# Patient Record
Sex: FEMALE | Race: WHITE | NOT HISPANIC OR LATINO | Employment: OTHER | URBAN - METROPOLITAN AREA
[De-identification: names, ages, dates, MRNs, and addresses within clinical notes are randomized per-mention and may not be internally consistent; named-entity substitution may affect disease eponyms.]

---

## 2017-01-03 ENCOUNTER — HOSPITAL ENCOUNTER (OUTPATIENT)
Dept: PHYSICAL THERAPY | Facility: HOSPITAL | Age: 57
Setting detail: RECURRING SERIES
Discharge: HOME | End: 2017-01-16
Attending: NURSE PRACTITIONER | Admitting: NURSE PRACTITIONER

## 2017-01-17 ENCOUNTER — HOSPITAL ENCOUNTER (OUTPATIENT)
Dept: ORTHOPEDIC SURGERY | Facility: CLINIC | Age: 57
Discharge: HOME | End: 2017-01-17
Attending: PODIATRIST | Admitting: PODIATRIST

## 2017-01-17 ENCOUNTER — CONVERSION ENCOUNTER (OUTPATIENT)
Dept: ORTHOPEDIC SURGERY | Facility: CLINIC | Age: 57
End: 2017-01-17

## 2017-04-18 ENCOUNTER — CONVERSION ENCOUNTER (OUTPATIENT)
Dept: ORTHOPEDIC SURGERY | Facility: CLINIC | Age: 57
End: 2017-04-18

## 2017-04-28 ENCOUNTER — OFFICE (AMBULATORY)
Dept: URBAN - METROPOLITAN AREA CLINIC 64 | Facility: CLINIC | Age: 57
End: 2017-04-28
Payer: COMMERCIAL

## 2017-04-28 VITALS
HEIGHT: 69 IN | WEIGHT: 293 LBS | SYSTOLIC BLOOD PRESSURE: 120 MMHG | HEART RATE: 57 BPM | DIASTOLIC BLOOD PRESSURE: 65 MMHG

## 2017-04-28 DIAGNOSIS — Z86.010 PERSONAL HISTORY OF COLONIC POLYPS: ICD-10-CM

## 2017-04-28 DIAGNOSIS — K59.00 CONSTIPATION, UNSPECIFIED: ICD-10-CM

## 2017-04-28 PROCEDURE — 99203 OFFICE O/P NEW LOW 30 MIN: CPT | Performed by: INTERNAL MEDICINE

## 2017-04-28 RX ORDER — POLYETHYLENE GLYCOL 3350 17 G/17G
17 POWDER, FOR SOLUTION ORAL
Qty: 1 | Refills: 11 | Status: COMPLETED
Start: 2017-04-28 | End: 2017-09-27

## 2017-05-04 ENCOUNTER — CONVERSION ENCOUNTER (OUTPATIENT)
Dept: ORTHOPEDIC SURGERY | Facility: CLINIC | Age: 57
End: 2017-05-04

## 2017-06-29 ENCOUNTER — OFFICE (AMBULATORY)
Dept: URBAN - METROPOLITAN AREA CLINIC 64 | Facility: CLINIC | Age: 57
End: 2017-06-29
Payer: COMMERCIAL

## 2017-06-29 VITALS
WEIGHT: 293 LBS | HEART RATE: 62 BPM | SYSTOLIC BLOOD PRESSURE: 130 MMHG | HEIGHT: 69 IN | DIASTOLIC BLOOD PRESSURE: 70 MMHG

## 2017-06-29 DIAGNOSIS — Z86.010 PERSONAL HISTORY OF COLONIC POLYPS: ICD-10-CM

## 2017-06-29 DIAGNOSIS — K59.00 CONSTIPATION, UNSPECIFIED: ICD-10-CM

## 2017-06-29 PROCEDURE — 99214 OFFICE O/P EST MOD 30 MIN: CPT | Performed by: INTERNAL MEDICINE

## 2017-09-28 ENCOUNTER — ON CAMPUS - OUTPATIENT (AMBULATORY)
Dept: URBAN - METROPOLITAN AREA HOSPITAL 2 | Facility: HOSPITAL | Age: 57
End: 2017-09-28
Payer: COMMERCIAL

## 2017-09-28 VITALS
HEART RATE: 93 BPM | OXYGEN SATURATION: 97 % | DIASTOLIC BLOOD PRESSURE: 51 MMHG | WEIGHT: 293 LBS | DIASTOLIC BLOOD PRESSURE: 89 MMHG | TEMPERATURE: 97.9 F | DIASTOLIC BLOOD PRESSURE: 78 MMHG | SYSTOLIC BLOOD PRESSURE: 127 MMHG | HEART RATE: 68 BPM | HEART RATE: 69 BPM | TEMPERATURE: 97.9 F | DIASTOLIC BLOOD PRESSURE: 67 MMHG | HEART RATE: 91 BPM | DIASTOLIC BLOOD PRESSURE: 99 MMHG | DIASTOLIC BLOOD PRESSURE: 51 MMHG | HEART RATE: 83 BPM | WEIGHT: 293 LBS | HEART RATE: 83 BPM | HEART RATE: 91 BPM | SYSTOLIC BLOOD PRESSURE: 144 MMHG | HEART RATE: 84 BPM | HEART RATE: 91 BPM | RESPIRATION RATE: 19 BRPM | OXYGEN SATURATION: 99 % | HEART RATE: 68 BPM | RESPIRATION RATE: 16 BRPM | SYSTOLIC BLOOD PRESSURE: 144 MMHG | DIASTOLIC BLOOD PRESSURE: 99 MMHG | OXYGEN SATURATION: 99 % | RESPIRATION RATE: 17 BRPM | HEART RATE: 69 BPM | HEIGHT: 69 IN | DIASTOLIC BLOOD PRESSURE: 67 MMHG | DIASTOLIC BLOOD PRESSURE: 78 MMHG | HEIGHT: 69 IN | SYSTOLIC BLOOD PRESSURE: 169 MMHG | SYSTOLIC BLOOD PRESSURE: 128 MMHG | OXYGEN SATURATION: 97 % | SYSTOLIC BLOOD PRESSURE: 142 MMHG | TEMPERATURE: 97.9 F | DIASTOLIC BLOOD PRESSURE: 89 MMHG | DIASTOLIC BLOOD PRESSURE: 89 MMHG | SYSTOLIC BLOOD PRESSURE: 128 MMHG | OXYGEN SATURATION: 96 % | DIASTOLIC BLOOD PRESSURE: 67 MMHG | HEART RATE: 93 BPM | HEART RATE: 68 BPM | SYSTOLIC BLOOD PRESSURE: 128 MMHG | HEART RATE: 85 BPM | RESPIRATION RATE: 17 BRPM | DIASTOLIC BLOOD PRESSURE: 93 MMHG | DIASTOLIC BLOOD PRESSURE: 93 MMHG | OXYGEN SATURATION: 100 % | RESPIRATION RATE: 19 BRPM | HEART RATE: 69 BPM | DIASTOLIC BLOOD PRESSURE: 69 MMHG | OXYGEN SATURATION: 100 % | RESPIRATION RATE: 17 BRPM | DIASTOLIC BLOOD PRESSURE: 69 MMHG | HEART RATE: 85 BPM | DIASTOLIC BLOOD PRESSURE: 99 MMHG | DIASTOLIC BLOOD PRESSURE: 93 MMHG | SYSTOLIC BLOOD PRESSURE: 132 MMHG | RESPIRATION RATE: 16 BRPM | SYSTOLIC BLOOD PRESSURE: 140 MMHG | OXYGEN SATURATION: 96 % | HEART RATE: 85 BPM | DIASTOLIC BLOOD PRESSURE: 78 MMHG | HEART RATE: 93 BPM | DIASTOLIC BLOOD PRESSURE: 69 MMHG | SYSTOLIC BLOOD PRESSURE: 144 MMHG | SYSTOLIC BLOOD PRESSURE: 132 MMHG | SYSTOLIC BLOOD PRESSURE: 140 MMHG | OXYGEN SATURATION: 95 % | OXYGEN SATURATION: 97 % | HEART RATE: 83 BPM | WEIGHT: 293 LBS | OXYGEN SATURATION: 95 % | SYSTOLIC BLOOD PRESSURE: 127 MMHG | OXYGEN SATURATION: 100 % | OXYGEN SATURATION: 95 % | SYSTOLIC BLOOD PRESSURE: 142 MMHG | SYSTOLIC BLOOD PRESSURE: 142 MMHG | RESPIRATION RATE: 19 BRPM | SYSTOLIC BLOOD PRESSURE: 169 MMHG | SYSTOLIC BLOOD PRESSURE: 140 MMHG | RESPIRATION RATE: 16 BRPM | HEART RATE: 84 BPM | OXYGEN SATURATION: 96 % | SYSTOLIC BLOOD PRESSURE: 169 MMHG | SYSTOLIC BLOOD PRESSURE: 127 MMHG | DIASTOLIC BLOOD PRESSURE: 51 MMHG | OXYGEN SATURATION: 99 % | HEIGHT: 69 IN | HEART RATE: 84 BPM | SYSTOLIC BLOOD PRESSURE: 132 MMHG

## 2017-09-28 DIAGNOSIS — Z86.010 PERSONAL HISTORY OF COLONIC POLYPS: ICD-10-CM

## 2017-09-28 DIAGNOSIS — Z08 ENCOUNTER FOR FOLLOW-UP EXAMINATION AFTER COMPLETED TREATMEN: ICD-10-CM

## 2017-09-28 DIAGNOSIS — K57.30 DIVERTICULOSIS OF LARGE INTESTINE WITHOUT PERFORATION OR ABS: ICD-10-CM

## 2017-09-28 PROCEDURE — 45378 DIAGNOSTIC COLONOSCOPY: CPT | Performed by: INTERNAL MEDICINE

## 2017-09-28 PROCEDURE — G0105 COLORECTAL SCRN; HI RISK IND: HCPCS | Performed by: INTERNAL MEDICINE

## 2017-09-28 RX ADMIN — PROPOFOL: 10 INJECTION, EMULSION INTRAVENOUS at 15:39

## 2018-03-06 ENCOUNTER — CONVERSION ENCOUNTER (OUTPATIENT)
Dept: ORTHOPEDIC SURGERY | Facility: CLINIC | Age: 58
End: 2018-03-06

## 2018-04-12 ENCOUNTER — CONVERSION ENCOUNTER (OUTPATIENT)
Dept: ORTHOPEDIC SURGERY | Facility: CLINIC | Age: 58
End: 2018-04-12

## 2018-05-24 ENCOUNTER — HOSPITAL ENCOUNTER (OUTPATIENT)
Dept: ORTHOPEDIC SURGERY | Facility: CLINIC | Age: 58
Discharge: HOME | End: 2018-05-24
Attending: ORTHOPAEDIC SURGERY | Admitting: ORTHOPAEDIC SURGERY

## 2018-05-24 ENCOUNTER — CONVERSION ENCOUNTER (OUTPATIENT)
Dept: ORTHOPEDIC SURGERY | Facility: CLINIC | Age: 58
End: 2018-05-24

## 2018-06-14 ENCOUNTER — CONVERSION ENCOUNTER (OUTPATIENT)
Dept: ORTHOPEDIC SURGERY | Facility: CLINIC | Age: 58
End: 2018-06-14

## 2018-07-09 ENCOUNTER — CONVERSION ENCOUNTER (OUTPATIENT)
Dept: ORTHOPEDIC SURGERY | Facility: CLINIC | Age: 58
End: 2018-07-09

## 2018-07-17 ENCOUNTER — HOSPITAL ENCOUNTER (OUTPATIENT)
Dept: PHYSICAL THERAPY | Facility: HOSPITAL | Age: 58
Setting detail: RECURRING SERIES
Discharge: HOME | End: 2018-09-19
Attending: ORTHOPAEDIC SURGERY | Admitting: ORTHOPAEDIC SURGERY

## 2018-09-24 ENCOUNTER — CONVERSION ENCOUNTER (OUTPATIENT)
Dept: ORTHOPEDIC SURGERY | Facility: CLINIC | Age: 58
End: 2018-09-24

## 2019-03-04 ENCOUNTER — CONVERSION ENCOUNTER (OUTPATIENT)
Dept: ORTHOPEDIC SURGERY | Facility: CLINIC | Age: 59
End: 2019-03-04

## 2019-06-04 VITALS
WEIGHT: 293 LBS | HEIGHT: 69 IN | BODY MASS INDEX: 46.52 KG/M2 | HEART RATE: 69 BPM | HEART RATE: 63 BPM | SYSTOLIC BLOOD PRESSURE: 151 MMHG | DIASTOLIC BLOOD PRESSURE: 78 MMHG | BODY MASS INDEX: 43.4 KG/M2 | SYSTOLIC BLOOD PRESSURE: 145 MMHG | HEIGHT: 69 IN | DIASTOLIC BLOOD PRESSURE: 73 MMHG | SYSTOLIC BLOOD PRESSURE: 119 MMHG | WEIGHT: 293 LBS | HEART RATE: 59 BPM | SYSTOLIC BLOOD PRESSURE: 130 MMHG | WEIGHT: 293 LBS | HEIGHT: 69 IN | BODY MASS INDEX: 43.4 KG/M2 | DIASTOLIC BLOOD PRESSURE: 78 MMHG | HEIGHT: 69 IN | SYSTOLIC BLOOD PRESSURE: 181 MMHG | WEIGHT: 293 LBS | HEART RATE: 69 BPM | HEIGHT: 69 IN | WEIGHT: 293 LBS | SYSTOLIC BLOOD PRESSURE: 126 MMHG | DIASTOLIC BLOOD PRESSURE: 75 MMHG | WEIGHT: 293 LBS | HEART RATE: 71 BPM | DIASTOLIC BLOOD PRESSURE: 81 MMHG | HEIGHT: 69 IN | SYSTOLIC BLOOD PRESSURE: 111 MMHG | SYSTOLIC BLOOD PRESSURE: 124 MMHG | SYSTOLIC BLOOD PRESSURE: 161 MMHG | HEART RATE: 62 BPM | WEIGHT: 293 LBS | HEART RATE: 78 BPM | WEIGHT: 293 LBS | HEART RATE: 67 BPM | DIASTOLIC BLOOD PRESSURE: 80 MMHG | WEIGHT: 293 LBS | DIASTOLIC BLOOD PRESSURE: 82 MMHG | OXYGEN SATURATION: 96 % | BODY MASS INDEX: 43.4 KG/M2 | BODY MASS INDEX: 43.4 KG/M2 | BODY MASS INDEX: 46.59 KG/M2 | OXYGEN SATURATION: 97 % | DIASTOLIC BLOOD PRESSURE: 68 MMHG | DIASTOLIC BLOOD PRESSURE: 78 MMHG | OXYGEN SATURATION: 95 % | BODY MASS INDEX: 43.4 KG/M2

## 2019-10-19 ENCOUNTER — HOSPITAL ENCOUNTER (EMERGENCY)
Facility: HOSPITAL | Age: 59
Discharge: HOME | End: 2019-10-20
Admitting: EMERGENCY MEDICINE

## 2019-10-19 VITALS
SYSTOLIC BLOOD PRESSURE: 192 MMHG | OXYGEN SATURATION: 98 % | HEART RATE: 61 BPM | RESPIRATION RATE: 17 BRPM | DIASTOLIC BLOOD PRESSURE: 96 MMHG | HEIGHT: 69 IN | TEMPERATURE: 97.9 F | WEIGHT: 293 LBS | BODY MASS INDEX: 43.4 KG/M2

## 2019-10-19 DIAGNOSIS — H60.501 ACUTE OTITIS EXTERNA OF RIGHT EAR, UNSPECIFIED TYPE: Primary | ICD-10-CM

## 2019-10-19 PROCEDURE — 99282 EMERGENCY DEPT VISIT SF MDM: CPT

## 2019-10-19 RX ORDER — NEOMYCIN SULFATE, POLYMYXIN B SULFATE AND GRAMICIDIN 1.75; 10000; .025 MG/ML; [USP'U]/ML; MG/ML
SOLUTION/ DROPS OPHTHALMIC
COMMUNITY
End: 2021-03-17

## 2019-10-19 RX ORDER — AMOXICILLIN AND CLAVULANATE POTASSIUM 875; 125 MG/1; MG/1
1 TABLET, FILM COATED ORAL 2 TIMES DAILY
COMMUNITY
End: 2021-02-07

## 2019-10-20 RX ORDER — CIPROFLOXACIN 500 MG/1
500 TABLET, FILM COATED ORAL 2 TIMES DAILY
Qty: 14 TABLET | Refills: 0 | Status: SHIPPED | OUTPATIENT
Start: 2019-10-20 | End: 2021-02-07

## 2019-10-20 NOTE — ED PROVIDER NOTES
Subjective   History:  Patient is a 59-year-old female presents to the ER with right ear pain.  She reports that since this summer she has been treated intermittently for otitis externa.  She reports that she is been placed on Keflex twice and had eardrops prescribed to her but she reports she is not getting any better.  She now reports some pain behind her right ear muffled hearing.    Onset: Several months  Location: Right ear  Duration: Intermittent  Character: Sharp pain and pressure  Aggravating/Alleviating factors: None  Radiation none  Severity: Moderate              Review of Systems   Constitutional: Negative.    HENT: Positive for ear pain.    Respiratory: Negative.    Cardiovascular: Negative.    Gastrointestinal: Negative.    Genitourinary: Negative.    Musculoskeletal: Negative.    Skin: Negative.    Neurological: Negative.        No past medical history on file.    Allergies   Allergen Reactions   • Morphine Shortness Of Breath       No past surgical history on file.    No family history on file.    Social History     Socioeconomic History   • Marital status: Legally      Spouse name: Not on file   • Number of children: Not on file   • Years of education: Not on file   • Highest education level: Not on file           Objective   Physical Exam   Constitutional: She is oriented to person, place, and time. She appears well-developed and well-nourished.   HENT:   Head: Normocephalic and atraumatic.   Slightly edematous auditory canal.  No mastoid tenderness or bogginess.  Earwax noted in right auditory canal blocking tympanic membrane   Eyes: Pupils are equal, round, and reactive to light.   Neck: Normal range of motion.   Pulmonary/Chest: Effort normal.   Musculoskeletal: Normal range of motion.   Neurological: She is alert and oriented to person, place, and time.   Skin: Skin is warm and dry.   Psychiatric: She has a normal mood and affect. Her behavior is normal.       Procedures           ED  Course    No radiology results for the last day  Labs Reviewed - No data to display  Medications - No data to display                MDM  Number of Diagnoses or Management Options  Acute otitis externa of right ear, unspecified type:   Diagnosis management comments: DISPOSITION:   Chart Review:  Comorbidity:  has no past medical history on file.  Differentials:this list is not all inclusive and does not constitute the entirety of considered causes --> Otitis media versus otitis externa    Imaging: Was interpreted by physician and reviewed by myself:  No radiology results for the last day    Disposition/Treatment:  Patient is a 59-year-old female presents to the ER with intermittent right ear pain for the past several months she is been on Keflex twice and eardrops without relief.  She was initiated on a course of ciprofloxacin and she was told to follow-up with [facility] on Monday she was stable at time of discharge agreement with plan    Patient Progress  Patient progress: stable      Final diagnoses:   Acute otitis externa of right ear, unspecified type              Samia Allen PA-C  10/20/19 0019

## 2019-10-20 NOTE — ED NOTES
Pt reports earache intermittently for 2 months, was taking ear drops from PCP, returned to PCP and was told to continue using drops. Reports she is not here for pain medication.      Kasey Graham, PAULN  10/19/19 [id number]

## 2019-10-20 NOTE — DISCHARGE INSTRUCTIONS
Return to the ER for any worsening symptoms follow-up with [facility] on Monday for further management

## 2020-05-14 ENCOUNTER — OFFICE VISIT (OUTPATIENT)
Dept: CARDIOLOGY | Facility: CLINIC | Age: 60
End: 2020-05-14

## 2020-05-14 VITALS — BODY MASS INDEX: 48.14 KG/M2 | WEIGHT: 293 LBS

## 2020-05-14 DIAGNOSIS — I10 ESSENTIAL HYPERTENSION: ICD-10-CM

## 2020-05-14 DIAGNOSIS — R00.2 PALPITATIONS: Primary | ICD-10-CM

## 2020-05-14 DIAGNOSIS — G47.33 OBSTRUCTIVE SLEEP APNEA: ICD-10-CM

## 2020-05-14 PROCEDURE — 99213 OFFICE O/P EST LOW 20 MIN: CPT | Performed by: INTERNAL MEDICINE

## 2020-05-14 RX ORDER — LORATADINE 10 MG/1
TABLET ORAL
COMMUNITY
Start: 2020-05-11

## 2020-05-14 RX ORDER — FUROSEMIDE 20 MG/1
TABLET ORAL DAILY
COMMUNITY
Start: 2020-05-11

## 2020-05-14 RX ORDER — CYCLOBENZAPRINE HCL 10 MG
TABLET ORAL AS NEEDED
COMMUNITY
Start: 2016-05-05 | End: 2021-03-17

## 2020-05-14 RX ORDER — METOPROLOL SUCCINATE 25 MG/1
TABLET, EXTENDED RELEASE ORAL 2 TIMES WEEKLY
COMMUNITY
Start: 2020-05-11

## 2020-05-14 RX ORDER — UBIDECARENONE 75 MG
CAPSULE ORAL
COMMUNITY
Start: 2016-05-05 | End: 2021-02-07

## 2020-05-14 RX ORDER — ESOMEPRAZOLE 20 MG/1
CAPSULE, DELAYED RELEASE ORAL DAILY
COMMUNITY
Start: 2020-04-09

## 2020-05-14 RX ORDER — HYDROCODONE BITARTRATE AND ACETAMINOPHEN 10; 325 MG/1; MG/1
TABLET ORAL DAILY
COMMUNITY
Start: 2020-05-11

## 2020-05-14 RX ORDER — DICLOFENAC SODIUM 75 MG/1
TABLET, DELAYED RELEASE ORAL AS NEEDED
COMMUNITY
Start: 2020-02-06

## 2020-05-14 RX ORDER — TRAZODONE HYDROCHLORIDE 100 MG/1
200 TABLET ORAL NIGHTLY
COMMUNITY
Start: 2020-05-11

## 2020-05-14 RX ORDER — LEVOTHYROXINE SODIUM 112 UG/1
TABLET ORAL DAILY
COMMUNITY
Start: 2020-05-11

## 2020-05-14 RX ORDER — ALLOPURINOL 300 MG/1
TABLET ORAL
COMMUNITY
Start: 2020-02-06

## 2020-05-14 RX ORDER — KETOCONAZOLE 20 MG/ML
SHAMPOO TOPICAL
COMMUNITY
Start: 2020-05-11 | End: 2021-03-17

## 2020-05-14 RX ORDER — LISINOPRIL AND HYDROCHLOROTHIAZIDE 25; 20 MG/1; MG/1
TABLET ORAL DAILY
COMMUNITY
Start: 2020-05-11

## 2020-05-14 RX ORDER — FERROUS SULFATE 325(65) MG
325 TABLET ORAL
COMMUNITY
End: 2021-02-07

## 2020-05-14 RX ORDER — GABAPENTIN 600 MG/1
TABLET ORAL 4 TIMES DAILY
COMMUNITY
Start: 2020-05-11
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
## 2020-08-12 ENCOUNTER — OFFICE VISIT (OUTPATIENT)
Dept: NEUROLOGY | Facility: CLINIC | Age: 60
End: 2020-08-12

## 2020-08-12 ENCOUNTER — OFFICE (AMBULATORY)
Dept: URBAN - METROPOLITAN AREA CLINIC 64 | Facility: CLINIC | Age: 60
End: 2020-08-12
Payer: COMMERCIAL

## 2020-08-12 VITALS
HEART RATE: 72 BPM | BODY MASS INDEX: 43.4 KG/M2 | HEIGHT: 69 IN | WEIGHT: 293 LBS | SYSTOLIC BLOOD PRESSURE: 169 MMHG | TEMPERATURE: 98 F | DIASTOLIC BLOOD PRESSURE: 68 MMHG

## 2020-08-12 VITALS
WEIGHT: 293 LBS | SYSTOLIC BLOOD PRESSURE: 184 MMHG | HEIGHT: 69 IN | HEART RATE: 78 BPM | DIASTOLIC BLOOD PRESSURE: 84 MMHG

## 2020-08-12 DIAGNOSIS — M79.641 PAIN IN BOTH HANDS: ICD-10-CM

## 2020-08-12 DIAGNOSIS — M79.642 PAIN IN BOTH HANDS: ICD-10-CM

## 2020-08-12 DIAGNOSIS — K59.00 CONSTIPATION, UNSPECIFIED: ICD-10-CM

## 2020-08-12 DIAGNOSIS — K30 FUNCTIONAL DYSPEPSIA: ICD-10-CM

## 2020-08-12 DIAGNOSIS — G47.33 OBSTRUCTIVE SLEEP APNEA SYNDROME: Primary | ICD-10-CM

## 2020-08-12 DIAGNOSIS — R10.33 PERIUMBILICAL PAIN: ICD-10-CM

## 2020-08-12 PROBLEM — M75.100 NONTRAUMATIC TEAR OF ROTATOR CUFF: Status: ACTIVE | Noted: 2018-07-09

## 2020-08-12 PROBLEM — F32.A DEPRESSION: Status: ACTIVE | Noted: 2020-08-12

## 2020-08-12 PROBLEM — M19.91 PRIMARY LOCALIZED OSTEOARTHRITIS: Status: ACTIVE | Noted: 2018-09-24

## 2020-08-12 PROBLEM — I10 HYPERTENSION: Status: ACTIVE | Noted: 2020-08-12

## 2020-08-12 PROBLEM — M10.9 GOUT: Status: ACTIVE | Noted: 2020-08-12

## 2020-08-12 PROBLEM — M79.609 PAIN IN LIMB: Status: ACTIVE | Noted: 2018-03-06

## 2020-08-12 PROBLEM — M25.9 DISORDER OF ANKLE: Status: ACTIVE | Noted: 2017-01-17

## 2020-08-12 PROBLEM — Z80.3 FAMILY HISTORY OF MALIGNANT NEOPLASM OF BREAST: Status: ACTIVE | Noted: 2020-08-12

## 2020-08-12 PROBLEM — M19.079 ARTHRITIS OF FOOT: Status: ACTIVE | Noted: 2017-01-17

## 2020-08-12 PROBLEM — G47.30 SLEEP APNEA: Status: ACTIVE | Noted: 2020-08-12

## 2020-08-12 PROBLEM — F41.9 ANXIETY DISORDER: Status: ACTIVE | Noted: 2020-08-12

## 2020-08-12 PROBLEM — Z80.0 FAMILY HISTORY OF MALIGNANT NEOPLASM OF COLON: Status: ACTIVE | Noted: 2020-08-12

## 2020-08-12 PROBLEM — G56.03 CARPAL TUNNEL SYNDROME, BILATERAL UPPER LIMBS: Status: ACTIVE | Noted: 2018-04-12

## 2020-08-12 PROBLEM — M25.512 PAIN IN LEFT SHOULDER: Status: ACTIVE | Noted: 2018-04-12

## 2020-08-12 PROBLEM — Z83.3 FAMILY HISTORY OF DIABETES MELLITUS: Status: ACTIVE | Noted: 2020-08-12

## 2020-08-12 PROCEDURE — 99214 OFFICE O/P EST MOD 30 MIN: CPT | Performed by: NURSE PRACTITIONER

## 2020-08-12 PROCEDURE — 99213 OFFICE O/P EST LOW 20 MIN: CPT | Performed by: PSYCHIATRY & NEUROLOGY

## 2020-08-12 RX ORDER — ESOMEPRAZOLE MAGNESIUM 40 MG/1
40 CAPSULE, DELAYED RELEASE ORAL
Qty: 90 | Refills: 3 | Status: COMPLETED
Start: 2020-08-12 | End: 2023-07-10

## 2020-08-12 RX ORDER — FAMOTIDINE 20 MG/1
TABLET, FILM COATED ORAL
Qty: 660 | Refills: 1 | Status: ACTIVE

## 2020-08-12 RX ORDER — LINACLOTIDE 290 UG/1
290 CAPSULE, GELATIN COATED ORAL
Qty: 90 | Refills: 3 | Status: COMPLETED
Start: 2020-08-12 | End: 2023-07-10
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
## 2020-08-13 ENCOUNTER — TELEPHONE (OUTPATIENT)
Dept: NEUROLOGY | Facility: CLINIC | Age: 60
End: 2020-08-13

## 2020-08-13 DIAGNOSIS — G47.33 OBSTRUCTIVE SLEEP APNEA: Primary | ICD-10-CM

## 2020-08-13 NOTE — TELEPHONE ENCOUNTER
----- Message from Joseph F Seipel, MD sent at 8/12/2020  5:43 PM EDT -----   FFM and goes through [facility]     Pt wants to try a different mask

## 2020-08-20 ENCOUNTER — OFFICE VISIT (OUTPATIENT)
Dept: CARDIOLOGY | Facility: CLINIC | Age: 60
End: 2020-08-20

## 2020-08-20 VITALS
HEIGHT: 69 IN | SYSTOLIC BLOOD PRESSURE: 116 MMHG | HEART RATE: 77 BPM | DIASTOLIC BLOOD PRESSURE: 75 MMHG | WEIGHT: 293 LBS | OXYGEN SATURATION: 97 % | BODY MASS INDEX: 43.4 KG/M2

## 2020-08-20 DIAGNOSIS — I10 ESSENTIAL HYPERTENSION: Primary | ICD-10-CM

## 2020-08-20 DIAGNOSIS — R00.2 PALPITATIONS: ICD-10-CM

## 2020-08-20 DIAGNOSIS — G47.33 OBSTRUCTIVE SLEEP APNEA SYNDROME: ICD-10-CM

## 2020-08-20 PROCEDURE — 99213 OFFICE O/P EST LOW 20 MIN: CPT | Performed by: INTERNAL MEDICINE

## 2020-08-20 RX ORDER — BIOTIN 10000 MCG
CAPSULE ORAL
COMMUNITY
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
## 2021-02-07 ENCOUNTER — HOSPITAL ENCOUNTER (EMERGENCY)
Facility: HOSPITAL | Age: 61
Discharge: HOME | End: 2021-02-07
Attending: EMERGENCY MEDICINE | Admitting: EMERGENCY MEDICINE

## 2021-02-07 ENCOUNTER — APPOINTMENT (OUTPATIENT)
Dept: CT IMAGING | Facility: HOSPITAL | Age: 61
End: 2021-02-07

## 2021-02-07 VITALS
DIASTOLIC BLOOD PRESSURE: 65 MMHG | SYSTOLIC BLOOD PRESSURE: 159 MMHG | HEIGHT: 69 IN | OXYGEN SATURATION: 99 % | HEART RATE: 62 BPM | WEIGHT: 293 LBS | BODY MASS INDEX: 43.4 KG/M2 | RESPIRATION RATE: 18 BRPM | TEMPERATURE: 98.2 F

## 2021-02-07 DIAGNOSIS — H81.11 BENIGN PAROXYSMAL POSITIONAL VERTIGO OF RIGHT EAR: Primary | ICD-10-CM

## 2021-02-07 LAB
ALBUMIN SERPL-MCNC: 4.2 G/DL (ref 3.5–5.2)
ALBUMIN/GLOB SERPL: 1.6 G/DL
ALP SERPL-CCNC: 95 U/L (ref 39–117)
ALT SERPL W P-5'-P-CCNC: 33 U/L (ref 1–33)
ANION GAP SERPL CALCULATED.3IONS-SCNC: 11 MMOL/L (ref 5–15)
AST SERPL-CCNC: 26 U/L (ref 1–32)
BASOPHILS # BLD AUTO: 0.1 10*3/MM3 (ref 0–0.2)
BASOPHILS NFR BLD AUTO: 0.8 % (ref 0–1.5)
BILIRUB SERPL-MCNC: 0.2 MG/DL (ref 0–1.2)
BUN SERPL-MCNC: 15 MG/DL (ref 8–23)
BUN/CREAT SERPL: 16.3 (ref 7–25)
CALCIUM SPEC-SCNC: 9.1 MG/DL (ref 8.6–10.5)
CHLORIDE SERPL-SCNC: 105 MMOL/L (ref 98–107)
CO2 SERPL-SCNC: 26 MMOL/L (ref 22–29)
CREAT SERPL-MCNC: 0.92 MG/DL (ref 0.57–1)
DEPRECATED RDW RBC AUTO: 46.4 FL (ref 37–54)
EOSINOPHIL # BLD AUTO: 0.3 10*3/MM3 (ref 0–0.4)
EOSINOPHIL NFR BLD AUTO: 3.6 % (ref 0.3–6.2)
ERYTHROCYTE [DISTWIDTH] IN BLOOD BY AUTOMATED COUNT: 13.8 % (ref 12.3–15.4)
GFR SERPL CREATININE-BSD FRML MDRD: 62 ML/MIN/1.73
GLOBULIN UR ELPH-MCNC: 2.7 GM/DL
GLUCOSE SERPL-MCNC: 121 MG/DL (ref 65–99)
HCT VFR BLD AUTO: 38.7 % (ref 34–46.6)
HGB BLD-MCNC: 12.9 G/DL (ref 12–15.9)
HOLD SPECIMEN: NORMAL
LYMPHOCYTES # BLD AUTO: 3.4 10*3/MM3 (ref 0.7–3.1)
LYMPHOCYTES NFR BLD AUTO: 40.1 % (ref 19.6–45.3)
MCH RBC QN AUTO: 32.3 PG (ref 26.6–33)
MCHC RBC AUTO-ENTMCNC: 33.5 G/DL (ref 31.5–35.7)
MCV RBC AUTO: 96.5 FL (ref 79–97)
MONOCYTES # BLD AUTO: 0.6 10*3/MM3 (ref 0.1–0.9)
MONOCYTES NFR BLD AUTO: 7.1 % (ref 5–12)
NEUTROPHILS NFR BLD AUTO: 4.1 10*3/MM3 (ref 1.7–7)
NEUTROPHILS NFR BLD AUTO: 48.4 % (ref 42.7–76)
NRBC BLD AUTO-RTO: 0.1 /100 WBC (ref 0–0.2)
PLATELET # BLD AUTO: 251 10*3/MM3 (ref 140–450)
PMV BLD AUTO: 8.8 FL (ref 6–12)
POTASSIUM SERPL-SCNC: 4.4 MMOL/L (ref 3.5–5.2)
PROT SERPL-MCNC: 6.9 G/DL (ref 6–8.5)
RBC # BLD AUTO: 4.01 10*6/MM3 (ref 3.77–5.28)
SODIUM SERPL-SCNC: 142 MMOL/L (ref 136–145)
TROPONIN T SERPL-MCNC: <0.01 NG/ML (ref 0–0.03)
WBC # BLD AUTO: 8.5 10*3/MM3 (ref 3.4–10.8)

## 2021-02-07 PROCEDURE — 80053 COMPREHEN METABOLIC PANEL: CPT | Performed by: EMERGENCY MEDICINE

## 2021-02-07 PROCEDURE — 85025 COMPLETE CBC W/AUTO DIFF WBC: CPT | Performed by: EMERGENCY MEDICINE

## 2021-02-07 PROCEDURE — 84484 ASSAY OF TROPONIN QUANT: CPT | Performed by: EMERGENCY MEDICINE

## 2021-02-07 PROCEDURE — 25010000002 ONDANSETRON PER 1 MG: Performed by: EMERGENCY MEDICINE

## 2021-02-07 PROCEDURE — 70450 CT HEAD/BRAIN W/O DYE: CPT

## 2021-02-07 PROCEDURE — 96374 THER/PROPH/DIAG INJ IV PUSH: CPT

## 2021-02-07 PROCEDURE — 99284 EMERGENCY DEPT VISIT MOD MDM: CPT

## 2021-02-07 RX ORDER — MECLIZINE HYDROCHLORIDE 25 MG/1
25 TABLET ORAL ONCE
Status: COMPLETED | OUTPATIENT
Start: 2021-02-07 | End: 2021-02-07

## 2021-02-07 RX ORDER — ONDANSETRON 8 MG/1
8 TABLET, ORALLY DISINTEGRATING ORAL EVERY 8 HOURS PRN
Qty: 10 TABLET | Refills: 0 | Status: SHIPPED | OUTPATIENT
Start: 2021-02-07 | End: 2021-03-17

## 2021-02-07 RX ORDER — FLUTICASONE PROPIONATE 50 MCG
2 SPRAY, SUSPENSION (ML) NASAL DAILY
Qty: 9.9 ML | Refills: 0 | Status: SHIPPED | OUTPATIENT
Start: 2021-02-07

## 2021-02-07 RX ORDER — MECLIZINE HYDROCHLORIDE 25 MG/1
TABLET ORAL
Qty: 20 TABLET | Refills: 0 | Status: SHIPPED | OUTPATIENT
Start: 2021-02-07

## 2021-02-07 RX ORDER — ONDANSETRON 2 MG/ML
8 INJECTION INTRAMUSCULAR; INTRAVENOUS ONCE
Status: COMPLETED | OUTPATIENT
Start: 2021-02-07 | End: 2021-02-07

## 2021-02-07 RX ADMIN — ONDANSETRON 8 MG: 2 INJECTION, SOLUTION INTRAMUSCULAR; INTRAVENOUS at 02:38

## 2021-02-07 RX ADMIN — SODIUM CHLORIDE 1000 ML: 9 INJECTION, SOLUTION INTRAVENOUS at 02:31

## 2021-02-07 RX ADMIN — MECLIZINE HYDROCHLORIDE 25 MG: 25 TABLET ORAL at 02:02
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
## 2021-02-07 NOTE — DISCHARGE INSTRUCTIONS
You can try the Epley maneuver to help dizziness, see attached  Avoid rapid head movements, rest for the next 3 days  No driving for 3 days  Acacian as directed  Fluids

## 2021-02-09 ENCOUNTER — TRANSCRIBE ORDERS (OUTPATIENT)
Dept: PHYSICAL THERAPY | Facility: CLINIC | Age: 61
End: 2021-02-09

## 2021-02-09 DIAGNOSIS — M54.12 RADICULOPATHY, CERVICAL REGION: Primary | ICD-10-CM

## 2021-02-09 DIAGNOSIS — M54.13 RADICULOPATHY OF CERVICOTHORACIC REGION: ICD-10-CM

## 2021-02-09 DIAGNOSIS — M47.812 CERVICAL SPONDYLOSIS WITHOUT MYELOPATHY: ICD-10-CM

## 2021-02-23 ENCOUNTER — TREATMENT (OUTPATIENT)
Dept: PHYSICAL THERAPY | Facility: CLINIC | Age: 61
End: 2021-02-23

## 2021-02-23 DIAGNOSIS — M47.812 CERVICAL SPONDYLOSIS WITHOUT MYELOPATHY: ICD-10-CM

## 2021-02-23 DIAGNOSIS — M54.12 RADICULOPATHY, CERVICAL REGION: Primary | ICD-10-CM

## 2021-02-23 PROCEDURE — 97162 PT EVAL MOD COMPLEX 30 MIN: CPT | Performed by: PHYSICAL THERAPIST
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
## 2021-03-01 ENCOUNTER — TREATMENT (OUTPATIENT)
Dept: PHYSICAL THERAPY | Facility: CLINIC | Age: 61
End: 2021-03-01

## 2021-03-01 DIAGNOSIS — M54.12 RADICULOPATHY, CERVICAL REGION: Primary | ICD-10-CM

## 2021-03-01 DIAGNOSIS — M47.812 CERVICAL SPONDYLOSIS WITHOUT MYELOPATHY: ICD-10-CM

## 2021-03-01 PROCEDURE — 97140 MANUAL THERAPY 1/> REGIONS: CPT | Performed by: PHYSICAL THERAPIST

## 2021-03-01 PROCEDURE — 97110 THERAPEUTIC EXERCISES: CPT | Performed by: PHYSICAL THERAPIST

## 2021-03-01 PROCEDURE — 97035 APP MDLTY 1+ULTRASOUND EA 15: CPT | Performed by: PHYSICAL THERAPIST

## 2021-03-01 NOTE — PROGRESS NOTES
Physical Therapy Daily Progress Note    VISIT#: 2    Subjective   Tammie Whitehead reports: she has an appt at end of march to be assessed by neurologist for vision/feeling of passing out with movement of neck.       Objective     See Exercise, Manual, and Modality Logs for complete treatment.     Patient Education: updated HEP.    Access Code: [email or web address]   URL: [email or web address]   Date: 03/01/2021   Prepared by: Evan Sarmiento     Exercises  Seated Scapular Retraction - 10 reps - 2 sets - 1x daily - 7x weekly  Seated Shoulder Rolls - 10 reps - 2 sets - 1x daily - 7x weekly  Gentle Levator Scapulae Stretch - 3 reps - 1 sets - 20 sec hold - 1x daily - 7x weekly    Assessment/Plan - increase in muscle tone in pper and middle traps - pain decreased with manual and US.       Progress per Plan of Care            Timed:         Manual Therapy:   15      mins  06119;     Therapeutic Exercise:   15      mins  52007;     Neuromuscular Idania:        mins  66260;    Therapeutic Activity:          mins  64584;     Gait Training:           mins  82717;     Ultrasound:    10     mins  00258;    Ionto                                   mins   73390  Self Care                            mins   29229  Canalith Repos                   mins  4209  Aquatic                               mins 53698    Un-Timed:  Electrical Stimulation:         mins  78434 ( );  Dry Needling         mins self-pay  Traction          mins 92637  Low Eval          Mins  65451  Mod Eval          Mins  81926  High Eval                            Mins  18667  Re-Eval                               mins  40950    Timed Treatment:  40    mins   Total Treatment:    40    mins    Asael Sarmiento, PT

## 2021-03-04 ENCOUNTER — TREATMENT (OUTPATIENT)
Dept: PHYSICAL THERAPY | Facility: CLINIC | Age: 61
End: 2021-03-04

## 2021-03-04 DIAGNOSIS — M47.812 CERVICAL SPONDYLOSIS WITHOUT MYELOPATHY: ICD-10-CM

## 2021-03-04 DIAGNOSIS — M54.12 RADICULOPATHY, CERVICAL REGION: Primary | ICD-10-CM

## 2021-03-04 PROCEDURE — 97035 APP MDLTY 1+ULTRASOUND EA 15: CPT | Performed by: PHYSICAL THERAPIST

## 2021-03-04 PROCEDURE — 97110 THERAPEUTIC EXERCISES: CPT | Performed by: PHYSICAL THERAPIST

## 2021-03-04 PROCEDURE — 97140 MANUAL THERAPY 1/> REGIONS: CPT | Performed by: PHYSICAL THERAPIST

## 2021-03-04 NOTE — PROGRESS NOTES
Physical Therapy Daily Progress Note    VISIT#: 3    Subjective   Tammiejoselo Rivasn reports: slight decrease in muscle tension after last visit.; new pillow that seems to be helping.       Objective     See Exercise, Manual, and Modality Logs for complete treatment.     Patient Education:     Assessment/Plan -  Reviewed HEP and given new theraband for home use; less sensitivity in UT's.      Progress per Plan of Care            Timed:         Manual Therapy:   15      mins  62970;     Therapeutic Exercise:   20      mins  81361;     Neuromuscular Idania:        mins  06145;    Therapeutic Activity:          mins  15735;     Gait Training:           mins  01949;     Ultrasound:    10     mins  30960;    Ionto                                   mins   24242  Self Care                            mins   89617  Canalith Repos                   mins  4209  Aquatic                               mins 85464    Un-Timed:  Electrical Stimulation:         mins  05289 ( );  Dry Needling         mins self-pay  Traction          mins 31344  Low Eval          Mins  55781  Mod Eval          Mins  22873  High Eval                            Mins  85539  Re-Eval                               mins  58495    Timed Treatment:  45    mins   Total Treatment:    45    mins    Asael Sarmiento PT

## 2021-03-12 ENCOUNTER — TELEPHONE (OUTPATIENT)
Dept: NEUROLOGY | Facility: CLINIC | Age: 61
End: 2021-03-12

## 2021-03-12 NOTE — TELEPHONE ENCOUNTER
Has the patient been seen in the last 6 months? NO LAST APPOINTMENT WAS 8-13-20    Current issue/concern with equipment: PATIENT STATES WHILE SHE IS SLEEPING SHE STATES HER CPAP IS KICKING UP TO 15.2, SHE ALSO STATES HER NOSE PIECE KEEPS COMING OUT OF HER NOSE WHILE SLEEPING NO MATTER HOW TIGHT THE MASK IS, SHE ALSO STATES SHE IS WAKING UP WITH HEADACHES. PT WAS WONDERING IF SHE NEEDS TO BE SEEN BEFORE HER ONE YEAR CHECK UP    DME company: [employer]     What kind of mask type (full or nasal)? NASAL    Are you on a modem or chip? MODERN    BEST CALL BACK : [phone]

## 2021-03-16 RX ORDER — ALBUTEROL SULFATE 90 UG/1
AEROSOL, METERED RESPIRATORY (INHALATION)
COMMUNITY
Start: 2021-02-05

## 2021-03-16 RX ORDER — ACETAMINOPHEN 160 MG
TABLET,DISINTEGRATING ORAL
COMMUNITY
Start: 2021-03-06

## 2021-03-16 RX ORDER — FAMOTIDINE 20 MG/1
TABLET, FILM COATED ORAL
COMMUNITY
Start: 2021-02-05

## 2021-03-17 ENCOUNTER — OFFICE VISIT (OUTPATIENT)
Dept: CARDIOLOGY | Facility: CLINIC | Age: 61
End: 2021-03-17

## 2021-03-17 VITALS
OXYGEN SATURATION: 95 % | DIASTOLIC BLOOD PRESSURE: 80 MMHG | SYSTOLIC BLOOD PRESSURE: 162 MMHG | HEART RATE: 60 BPM | BODY MASS INDEX: 43.4 KG/M2 | WEIGHT: 293 LBS | TEMPERATURE: 96.8 F | HEIGHT: 69 IN

## 2021-03-17 DIAGNOSIS — I10 ESSENTIAL HYPERTENSION: Primary | ICD-10-CM

## 2021-03-17 DIAGNOSIS — G47.33 OBSTRUCTIVE SLEEP APNEA SYNDROME: ICD-10-CM

## 2021-03-17 DIAGNOSIS — R00.2 PALPITATIONS: ICD-10-CM

## 2021-03-17 PROCEDURE — 99214 OFFICE O/P EST MOD 30 MIN: CPT | Performed by: INTERNAL MEDICINE

## 2021-03-17 RX ORDER — VITAMIN B COMPLEX
CAPSULE ORAL DAILY
COMMUNITY
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
## 2021-03-18 NOTE — TELEPHONE ENCOUNTER
I reviewed the smartcard report.    The average air leak and AHI are both in the very good range.    For her comfort change the pressure setting from the current maximum pressure of 14 to a maximum pressure of 13    Continue the minimum pressure of 9

## 2021-03-25 ENCOUNTER — TELEPHONE (OUTPATIENT)
Dept: PHYSICAL THERAPY | Facility: CLINIC | Age: 61
End: 2021-03-25

## 2021-03-30 ENCOUNTER — TREATMENT (OUTPATIENT)
Dept: PHYSICAL THERAPY | Facility: CLINIC | Age: 61
End: 2021-03-30

## 2021-03-30 DIAGNOSIS — M47.812 CERVICAL SPONDYLOSIS WITHOUT MYELOPATHY: ICD-10-CM

## 2021-03-30 DIAGNOSIS — M54.12 RADICULOPATHY, CERVICAL REGION: Primary | ICD-10-CM

## 2021-03-30 PROCEDURE — 97035 APP MDLTY 1+ULTRASOUND EA 15: CPT | Performed by: PHYSICAL THERAPIST

## 2021-03-30 PROCEDURE — 97140 MANUAL THERAPY 1/> REGIONS: CPT | Performed by: PHYSICAL THERAPIST

## 2021-03-30 PROCEDURE — 97110 THERAPEUTIC EXERCISES: CPT | Performed by: PHYSICAL THERAPIST

## 2021-03-30 NOTE — PROGRESS NOTES
Physical Therapy Daily Progress Note    VISIT#: 4    Subjective   Tammie Whitehead reports: she had US of neck: waiting on results - MD to review on next visit. ER with RTB painful.    Objective     See Exercise, Manual, and Modality Logs for complete treatment.     Patient Education:     Assessment/Plan -  Reviewed HEP - discotinued ER w/ TB and advised to continue remaining exercises; trigger points with STM.    Progress per Plan of Care            Timed:         Manual Therapy:   15      mins  19121;     Therapeutic Exercise:   20      mins  07594;     Neuromuscular Idania:        mins  17350;    Therapeutic Activity:          mins  36443;     Gait Training:           mins  31346;     Ultrasound:    10     mins  89140;    Ionto                                   mins   65611  Self Care                            mins   65770  Canalith Repos                   mins  4209  Aquatic                               mins 77500    Un-Timed:  Electrical Stimulation:         mins  48301 ( );  Dry Needling         mins self-pay  Traction          mins 85208  Low Eval          Mins  20910  Mod Eval          Mins  83142  High Eval                            Mins  14171  Re-Eval                               mins  88861    Timed Treatment:  45    mins   Total Treatment:    45    mins    Asael Sarmiento PT

## 2021-04-01 ENCOUNTER — TREATMENT (OUTPATIENT)
Dept: PHYSICAL THERAPY | Facility: CLINIC | Age: 61
End: 2021-04-01

## 2021-04-01 DIAGNOSIS — M54.12 RADICULOPATHY, CERVICAL REGION: Primary | ICD-10-CM

## 2021-04-01 DIAGNOSIS — M47.812 CERVICAL SPONDYLOSIS WITHOUT MYELOPATHY: ICD-10-CM

## 2021-04-01 PROCEDURE — 97110 THERAPEUTIC EXERCISES: CPT | Performed by: PHYSICAL THERAPIST

## 2021-04-01 PROCEDURE — 97035 APP MDLTY 1+ULTRASOUND EA 15: CPT | Performed by: PHYSICAL THERAPIST

## 2021-04-01 PROCEDURE — 97140 MANUAL THERAPY 1/> REGIONS: CPT | Performed by: PHYSICAL THERAPIST

## 2021-04-01 NOTE — PROGRESS NOTES
Physical Therapy Daily Progress Note    VISIT#: 5    Subjective   Tammie Whitehead reports: reports sleeping better after last visit: less pain and tension in neck.  Objective     See Exercise, Manual, and Modality Logs for complete treatment.     Patient Education: discussed instability in cervical spine as elucidated by prior MD (Dr. Rea) and need to be careful with activities.     Assessment/Plan -  Continued current course of therapy; trigger points B middle and upper traps.    Progress per Plan of Care            Timed:         Manual Therapy:   15      mins  93812;     Therapeutic Exercise:   20      mins  06051;     Neuromuscular Idania:        mins  65119;    Therapeutic Activity:          mins  61569;     Gait Training:           mins  39131;     Ultrasound:    10     mins  22435;    Ionto                                   mins   23864  Self Care                            mins   03437  Canalith Repos                   mins  4209  Aquatic                               mins 49929    Un-Timed:  Electrical Stimulation:         mins  72929 ( );  Dry Needling         mins self-pay  Traction          mins 54896  Low Eval          Mins  45616  Mod Eval          Mins  91291  High Eval                            Mins  03017  Re-Eval                               mins  76958    Timed Treatment:  45    mins   Total Treatment:    45    mins    Asael Sarmiento PT

## 2021-04-05 ENCOUNTER — TREATMENT (OUTPATIENT)
Dept: PHYSICAL THERAPY | Facility: CLINIC | Age: 61
End: 2021-04-05

## 2021-04-05 DIAGNOSIS — M47.812 CERVICAL SPONDYLOSIS WITHOUT MYELOPATHY: ICD-10-CM

## 2021-04-05 DIAGNOSIS — M54.12 RADICULOPATHY, CERVICAL REGION: Primary | ICD-10-CM

## 2021-04-05 PROCEDURE — 97035 APP MDLTY 1+ULTRASOUND EA 15: CPT | Performed by: PHYSICAL THERAPIST

## 2021-04-05 PROCEDURE — 97140 MANUAL THERAPY 1/> REGIONS: CPT | Performed by: PHYSICAL THERAPIST

## 2021-04-05 PROCEDURE — 97110 THERAPEUTIC EXERCISES: CPT | Performed by: PHYSICAL THERAPIST

## 2021-04-05 NOTE — PROGRESS NOTES
Physical Therapy Daily Progress Note    VISIT#: 6    Subjective   Tammie Whitehead reports: reports sleeping better after last visit: less pain and tension in neck. Otherwise, same.   Objective     See Exercise, Manual, and Modality Logs for complete treatment.     Patient Education: updated HEP:    Access Code: [email or web address]  URL: [email or web address]  Date: 04/05/2021  Prepared by: Evan Sarmiento    Exercises  Seated Deep Neck Flexor Nods - 1 x daily - 7 x weekly - 2 sets - 10 reps - 5 sec hold    Assessment/Plan -  Good understanding of HEP; no increase in pain with interventions.     Progress per Plan of Care            Timed:         Manual Therapy:   15      mins  81666;     Therapeutic Exercise:   20      mins  73807;     Neuromuscular Idania:        mins  50764;    Therapeutic Activity:          mins  89451;     Gait Training:           mins  39574;     Ultrasound:    10     mins  27139;    Ionto                                   mins   72979  Self Care                            mins   48562  Canalith Repos                   mins  4209  Aquatic                               mins 29401    Un-Timed:  Electrical Stimulation:         mins  55659 ( );  Dry Needling         mins self-pay  Traction          mins 33725  Low Eval          Mins  12427  Mod Eval          Mins  41357  High Eval                            Mins  28571  Re-Eval                               mins  37449    Timed Treatment:  45    mins   Total Treatment:    45    mins    Asael Sarmiento PT

## 2021-04-07 ENCOUNTER — APPOINTMENT (OUTPATIENT)
Dept: CARDIOLOGY | Facility: HOSPITAL | Age: 61
End: 2021-04-07

## 2021-04-07 ENCOUNTER — HOSPITAL ENCOUNTER (EMERGENCY)
Facility: HOSPITAL | Age: 61
Discharge: HOME | End: 2021-04-07
Admitting: EMERGENCY MEDICINE

## 2021-04-07 VITALS
HEIGHT: 69 IN | DIASTOLIC BLOOD PRESSURE: 72 MMHG | BODY MASS INDEX: 43.4 KG/M2 | OXYGEN SATURATION: 99 % | TEMPERATURE: 98.4 F | RESPIRATION RATE: 14 BRPM | SYSTOLIC BLOOD PRESSURE: 121 MMHG | WEIGHT: 293 LBS | HEART RATE: 87 BPM

## 2021-04-07 DIAGNOSIS — M79.605 LEFT LEG PAIN: Primary | ICD-10-CM

## 2021-04-07 LAB
BH CV LOWER VASCULAR LEFT COMMON FEMORAL AUGMENT: NORMAL
BH CV LOWER VASCULAR LEFT COMMON FEMORAL COMPETENT: NORMAL
BH CV LOWER VASCULAR LEFT COMMON FEMORAL COMPRESS: NORMAL
BH CV LOWER VASCULAR LEFT COMMON FEMORAL PHASIC: NORMAL
BH CV LOWER VASCULAR LEFT COMMON FEMORAL SPONT: NORMAL
BH CV LOWER VASCULAR LEFT DISTAL FEMORAL COMPRESS: NORMAL
BH CV LOWER VASCULAR LEFT GASTRONEMIUS COMPRESS: NORMAL
BH CV LOWER VASCULAR LEFT GREATER SAPH AK COMPRESS: NORMAL
BH CV LOWER VASCULAR LEFT GREATER SAPH BK COMPRESS: NORMAL
BH CV LOWER VASCULAR LEFT LESSER SAPH COMPRESS: NORMAL
BH CV LOWER VASCULAR LEFT MID FEMORAL AUGMENT: NORMAL
BH CV LOWER VASCULAR LEFT MID FEMORAL COMPETENT: NORMAL
BH CV LOWER VASCULAR LEFT MID FEMORAL COMPRESS: NORMAL
BH CV LOWER VASCULAR LEFT MID FEMORAL PHASIC: NORMAL
BH CV LOWER VASCULAR LEFT MID FEMORAL SPONT: NORMAL
BH CV LOWER VASCULAR LEFT PERONEAL COMPRESS: NORMAL
BH CV LOWER VASCULAR LEFT POPLITEAL AUGMENT: NORMAL
BH CV LOWER VASCULAR LEFT POPLITEAL COMPETENT: NORMAL
BH CV LOWER VASCULAR LEFT POPLITEAL COMPRESS: NORMAL
BH CV LOWER VASCULAR LEFT POPLITEAL PHASIC: NORMAL
BH CV LOWER VASCULAR LEFT POPLITEAL SPONT: NORMAL
BH CV LOWER VASCULAR LEFT POSTERIOR TIBIAL COMPRESS: NORMAL
BH CV LOWER VASCULAR LEFT PROXIMAL FEMORAL COMPRESS: NORMAL
BH CV LOWER VASCULAR LEFT SAPHENOFEMORAL JUNCTION COMPRESS: NORMAL
BH CV LOWER VASCULAR LEFT VARICOSITY AK COMPRESS: NORMAL
BH CV LOWER VASCULAR LEFT VARICOSITY BK COMPRESS: NORMAL
BH CV LOWER VASCULAR RIGHT COMMON FEMORAL AUGMENT: NORMAL
BH CV LOWER VASCULAR RIGHT COMMON FEMORAL COMPETENT: NORMAL
BH CV LOWER VASCULAR RIGHT COMMON FEMORAL COMPRESS: NORMAL
BH CV LOWER VASCULAR RIGHT COMMON FEMORAL PHASIC: NORMAL
BH CV LOWER VASCULAR RIGHT COMMON FEMORAL SPONT: NORMAL

## 2021-04-07 PROCEDURE — 93971 EXTREMITY STUDY: CPT

## 2021-04-07 PROCEDURE — 99282 EMERGENCY DEPT VISIT SF MDM: CPT

## 2021-04-07 NOTE — DISCHARGE INSTRUCTIONS
Return for evaluation to the ED if worsening swelling or pain occurs either lower extremity. Patient okay to use home medication for pain control as prescribed.
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
## 2021-04-08 ENCOUNTER — TREATMENT (OUTPATIENT)
Dept: PHYSICAL THERAPY | Facility: CLINIC | Age: 61
End: 2021-04-08

## 2021-04-08 DIAGNOSIS — M47.812 CERVICAL SPONDYLOSIS WITHOUT MYELOPATHY: ICD-10-CM

## 2021-04-08 DIAGNOSIS — M54.12 RADICULOPATHY, CERVICAL REGION: Primary | ICD-10-CM

## 2021-04-08 PROCEDURE — 97035 APP MDLTY 1+ULTRASOUND EA 15: CPT | Performed by: PHYSICAL THERAPIST

## 2021-04-08 PROCEDURE — 97110 THERAPEUTIC EXERCISES: CPT | Performed by: PHYSICAL THERAPIST

## 2021-04-08 PROCEDURE — 97140 MANUAL THERAPY 1/> REGIONS: CPT | Performed by: PHYSICAL THERAPIST
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
## 2021-04-09 NOTE — PROGRESS NOTES
Discharge Summary  Discharge Summary from Physical Therapy Report    Patient: Tammie Whitehead   : 1960  Diagnosis/ICD-10 Code:  Radiculopathy, cervical region [M54.12]  Referring practitioner: Endy Pulido MD  Date of Initial Visit: 2021      Dates  PT visit: 21 to 21  Number of Visits: 7     Discharge Status of Patient: See above note for details of discharge.    Goals: Partially Met    Discharge Plan: Future need for rehabilitation activities  Patient to return to referring/providing physician  Refer to other services (specify):consider consult with spine specialist.      Date of Discharge 21        Asael Sarmiento PT  Physical Therapist

## 2021-09-13 ENCOUNTER — TELEPHONE (OUTPATIENT)
Dept: NEUROLOGY | Facility: CLINIC | Age: 61
End: 2021-09-13

## 2021-09-13 NOTE — TELEPHONE ENCOUNTER
Caller: Tammie Whitehead     Relationship: SELF    Best call back number: [phone]  Have you registered the machine with [employer]: [x]Yes []No    If NO, you will need to register the machine first, once you have it registered you can give us a call back.      [facility] phone number: [phone]   [employer] website: [email or web address]    If yes, continue with questions:    How old is the current machine: UNKNOWN    Who is the DME: [facility]    PT IS CALLING TO S/W PROVIDER RE: NEXT STEPS RE: CPAP.    PLEASE CALL & ADVISE.    THANK YOU.

## 2021-09-30 ENCOUNTER — TELEPHONE (OUTPATIENT)
Dept: CARDIOLOGY | Facility: CLINIC | Age: 61
End: 2021-09-30

## 2021-09-30 NOTE — TELEPHONE ENCOUNTER
Called and spoke to patient to see where her echo was done she said it was done and [facility]. Called [facility] to get echo faxed to us.

## 2021-09-30 NOTE — TELEPHONE ENCOUNTER
Wants apt asap with Dr. Whitfield. Issues with heart. BP staying high, 150-170/ 71-85. Heart rate 55-59. She needs to be on proper medication before something happens. Will not go to ER, scared of getting covid.

## 2021-09-30 NOTE — TELEPHONE ENCOUNTER
Patient left a voicemail stating it is not just blood pressure issues, states this has been going on for awhile, and ' if dr. cleary does not want to address it she will find another cardiologist' states she had abn echo, do not see echo report anywhere in patient's chart regarding this. Called her back and left a voicemail for her to call me back so I could get this information.

## 2021-09-30 NOTE — TELEPHONE ENCOUNTER
Called patient, asked patient if she was having any cardiac symptoms, patient said no, advised Dr. Whitfield said to follow up with PCP if only blood pressure issue, patient hung up on me after I told her.

## 2021-10-08 NOTE — PROGRESS NOTES
"Chief Complaint  Sleep Apnea    Subjective          Tammie Whitehead presents to [facility] NEUROLOGY  History of Present Illness  RAFAEL, patient f/u with CPAP compliance uses a nasal pillows and goes through [employer] for supplies.Having issues with mask.    Sleep testing history:    On NPSG at [facility] , 04/18/2017 patient had Severe obstructive sleep apnea syndrome with apnea-hypopnea index of 47 per sleep hour,     PAP download:  The patient is on CPAP therapy at 9-14 cm/H2O.   Data indicates Excellent compliance. With 100% usage for more than 4 hours with an average usage of 10 hours 27 minutes. AHI down to 1.9 .  Average pressures 11.7.  Average large leak 12min.   Pt co pressure in chest if pressure stays up at 14  The patient's hypersomnia has resolved       [place] Sleepiness Scale:  Sitting and reading 0 WatchingTV 0  Sitting, inactive, in a public place 0  As a passenger in a car for 1 hour w/o a break  0  Lying down to rest in the afternoon  3  Sitting and talking to someone  0  Sitting quietly after a lunch  0  In a car, while stopped for traffic or a light  0  Total 3    Review of Systems   Constitutional: Negative for fatigue and fever.   HENT: Positive for ear pain. Negative for ear discharge.    Eyes: Negative for pain and itching.   Respiratory: Negative for cough and shortness of breath.    Cardiovascular: Negative for chest pain.   Gastrointestinal: Negative for abdominal pain and nausea.   Musculoskeletal: Positive for neck pain. Negative for back pain.   Neurological: Negative for dizziness and light-headedness.   Psychiatric/Behavioral: Negative for agitation and confusion.         Objective   Vital Signs:   BP (!) 196/71 (BP Location: Right arm, Patient Position: Sitting, Cuff Size: Adult)   Pulse 67   Temp 96.8 °F (36 °C)   Ht 175.3 cm (69\")   Wt (!) 150 kg (330 lb)   BMI 48.73 kg/m²     Physical Exam  Vitals reviewed.   Constitutional:       Appearance: Normal appearance. "   Neurological:      General: No focal deficit present.      Mental Status: She is alert and oriented to person, place, and time.   Psychiatric:         Mood and Affect: Mood normal.         Behavior: Behavior normal.        Result Review :                 Assessment and Plan    Diagnoses and all orders for this visit:    1. Obstructive sleep apnea syndrome (Primary)    will decrease max pressure from 14 to 13  Pt aware of cpap recall   The patient is compliant with and benefiting from PAP therapy.      Follow Up   Return in about 1 year (around 10/18/2022).    Patient was given instructions and counseling regarding her condition or for health maintenance advice. Please see specific information pulled into the AVS if appropriate.       This document has been electronically signed by Joseph Seipel, MD on October 18, 2021 16:16 EDT

## 2021-10-18 ENCOUNTER — OFFICE VISIT (OUTPATIENT)
Dept: NEUROLOGY | Facility: CLINIC | Age: 61
End: 2021-10-18

## 2021-10-18 VITALS
HEART RATE: 67 BPM | BODY MASS INDEX: 43.4 KG/M2 | HEIGHT: 69 IN | DIASTOLIC BLOOD PRESSURE: 71 MMHG | TEMPERATURE: 96.8 F | SYSTOLIC BLOOD PRESSURE: 196 MMHG | WEIGHT: 293 LBS

## 2021-10-18 DIAGNOSIS — G47.33 OBSTRUCTIVE SLEEP APNEA SYNDROME: Primary | ICD-10-CM

## 2021-10-18 PROCEDURE — 99213 OFFICE O/P EST LOW 20 MIN: CPT | Performed by: PSYCHIATRY & NEUROLOGY

## 2021-10-18 RX ORDER — LISINOPRIL 20 MG/1
TABLET ORAL
COMMUNITY
Start: 2021-10-05

## 2021-10-18 RX ORDER — HYDROXYZINE PAMOATE 50 MG/1
CAPSULE ORAL
COMMUNITY
Start: 2021-09-28

## 2021-10-18 RX ORDER — ONDANSETRON 8 MG/1
TABLET, ORALLY DISINTEGRATING ORAL
COMMUNITY
Start: 2021-10-05

## 2021-10-19 ENCOUNTER — TELEPHONE (OUTPATIENT)
Dept: NEUROLOGY | Facility: CLINIC | Age: 61
End: 2021-10-19

## 2021-10-19 DIAGNOSIS — G47.33 OBSTRUCTIVE SLEEP APNEA SYNDROME: Primary | ICD-10-CM

## 2021-10-19 NOTE — TELEPHONE ENCOUNTER
----- Message from Joseph F Seipel, MD sent at 10/18/2021  4:17 PM EDT -----  Nasal pillows  goulds

## 2022-07-20 ENCOUNTER — TELEPHONE (OUTPATIENT)
Dept: NEUROLOGY | Facility: CLINIC | Age: 62
End: 2022-07-20

## 2022-07-20 NOTE — TELEPHONE ENCOUNTER
Provider: SEIPEL  Caller: PATIENT  Relationship to Patient: SELF  Pharmacy: N/A  Phone Number: [phone]  Reason for Call: PATIENT TELEPHONED TO ADVISE THAT SHE THINKS SHE FOUND THE PROBLEM; IT LOOKS LIKE THERE IS A MISSING PIECE IN HER REPLACEMENT UNIT.    ALSO, PATIENT SAYS THIS MACHINE IS NOT NEW; IT IS A REFURBISH AS ON THE BOTTOM OF THE MACHINE STATES 'RE-CERTIFIED DEVICE'. PATIENT PUT ON THE MASK & SMELLED CIGARETTE SMOKE. PATIENT DOES NOT SMOKE.    PATIENT IS WANTING A NEW MACHINE WITH ALL THE PARTS.    MACHINE CAME FROM [facility]RU [facility] IN [place]    NOTE: PATIENT STILL HAS OLD MACHINE AS IT WAS IN THE PROCESS OF BEING SENT BACK, BUT WAS ABLE TO BE RETRIEVED.    PLEASE CALL & ADVISE.    THANK YOU.

## 2022-07-20 NOTE — TELEPHONE ENCOUNTER
Provider: DR. SEIPEL  Caller: KAREN  Relationship to Patient: SELF  Pharmacy: N/A  Phone Number: [phone]  Reason for Call: PATIENT JUST RECEIVED HER REPLACEMENT CPAP, STATES SHE PUT THE SIM CARD IN THE MACHINE AND SHE CONNECTED THE MODEM, THE PRESSURE WILL NOT GO ABOVE 4.0, STATES HER MINIMUM PRESSURE IS 9.0    PATIENT STATES THAT THE OLD MACHINE HAS BEEN SENT BACK        PLEASE ADVISE     THANK YOU  When was the patient last seen: 10/18/2021

## 2022-10-12 NOTE — PROGRESS NOTES
"Chief Complaint  Sleep Apnea    Subjective          Tammie hWitehead presents to [facility] NEUROLOGY  History of Present Illness  RAFAEL  f/u for CPAP compliance, patient states she is benefiting from pap therapy,she uses nasal pillows and goes through [employer] for supplies.     Sleep testing history:    On NPSG at [facility] , 04/18/2017 patient had Severe obstructive sleep apnea syndrome with apnea-hypopnea index of 47 per sleep hour,    PAP download:  The patient is on CPAP therapy at 9-13 cm/H2O.   Data indicates Excellent compliance. With 96% usage for more than 4 hours with an average usage of 10 hours 6 minutes. AHI down to 1.7 .  Average pressures 11.  Average large leak 29min.     The patient's hypersomnia has resolved         Review of Systems   Constitutional: Negative for fatigue.   Respiratory: Negative for apnea.    Cardiovascular: Negative for chest pain.   Neurological: Negative for headaches.   All other systems reviewed and are negative.        Objective   Vital Signs:   /79 (BP Location: Left arm, Patient Position: Sitting, Cuff Size: Large Adult)   Pulse 64   Temp 97.3 °F (36.3 °C) (Infrared)   Ht 175.3 cm (69\")   Wt (!) 143 kg (316 lb)   BMI 46.67 kg/m²     Physical Exam  Vitals reviewed.   Cardiovascular:      Pulses: Normal pulses.   Pulmonary:      Effort: Pulmonary effort is normal. No respiratory distress.   Neurological:      General: No focal deficit present.      Mental Status: She is alert and oriented to person, place, and time.   Psychiatric:         Mood and Affect: Mood normal.        Result Review :                 Assessment and Plan    Diagnoses and all orders for this visit:    1. Obstructive sleep apnea syndrome (Primary)      Will decrease the pressure to 8-12 as pt has lost some weight  Adjust 20 min ramp continue start pressure 4  The patient is compliant with and benefiting from PAP therapy.      Follow Up   Return in about 1 year (around " 10/18/2023).    Patient was given instructions and counseling regarding her condition or for health maintenance advice. Please see specific information pulled into the AVS if appropriate.       This document has been electronically signed by Joseph Seipel, MD on October 18, 2022 16:15 EDT

## 2022-10-18 ENCOUNTER — OFFICE VISIT (OUTPATIENT)
Dept: NEUROLOGY | Facility: CLINIC | Age: 62
End: 2022-10-18

## 2022-10-18 VITALS
HEIGHT: 69 IN | BODY MASS INDEX: 43.4 KG/M2 | SYSTOLIC BLOOD PRESSURE: 166 MMHG | DIASTOLIC BLOOD PRESSURE: 79 MMHG | TEMPERATURE: 97.3 F | HEART RATE: 64 BPM | WEIGHT: 293 LBS

## 2022-10-18 DIAGNOSIS — G47.33 OBSTRUCTIVE SLEEP APNEA SYNDROME: Primary | ICD-10-CM

## 2022-10-18 PROCEDURE — 99213 OFFICE O/P EST LOW 20 MIN: CPT | Performed by: PSYCHIATRY & NEUROLOGY

## 2022-10-18 RX ORDER — FEXOFENADINE HCL 180 MG/1
TABLET ORAL
COMMUNITY
Start: 2022-09-07

## 2022-10-18 RX ORDER — ROSUVASTATIN CALCIUM 10 MG/1
TABLET, COATED ORAL
COMMUNITY
Start: 2022-10-17

## 2023-10-17 NOTE — PROGRESS NOTES
"Chief Complaint  Sleep Apnea    Subjective          Tammie Whitehead presents to [facility] NEUROLOGY  History of Present Illness  RAFAEL  f/u for CPAP compliance, patient states she is benefiting from pap therapy,she uses nasal pillows and goes through [employer] for supplies.     Sleep testing history:    On NPSG at [facility] , 04/18/2017 patient had Severe obstructive sleep apnea syndrome with apnea-hypopnea index of 47 per sleep hour,    PAP download:  The patient is on CPAP therapy at 8-12 cm/H2O.   Data indicates Excellent compliance. With 100% usage for more than 4 hours with an average usage of 10 hours 22 minutes. AHI down to 1.8 .  Average pressures 10.1.  Average large leak 29min.     The patient's hypersomnia has resolved       [place] Sleepiness Scale:  Sitting and reading 0 WatchingTV 0  Sitting, inactive, in a public place 0  As a passenger in a car for 1 hour w/o a break  1  Lying down to rest in the afternoon  1  Sitting and talking to someone  0  Sitting quietly after a lunch  0  In a car, while stopped for traffic or a light  0  Total 2    Review of Systems   Constitutional:  Negative for fatigue.   Respiratory:  Negative for apnea and shortness of breath.    Psychiatric/Behavioral:  Negative for sleep disturbance.    All other systems reviewed and are negative.        Objective   Vital Signs:   /79   Pulse 56   Ht 175.3 cm (69\")   Wt (!) 145 kg (320 lb)   BMI 47.26 kg/m²     Physical Exam  Vitals reviewed.   HENT:      Head: Normocephalic.      Nose: Nose normal.      Mouth/Throat:      Mouth: Mucous membranes are moist.   Cardiovascular:      Rate and Rhythm: Normal rate.   Pulmonary:      Effort: Pulmonary effort is normal. No respiratory distress.   Neurological:      General: No focal deficit present.      Mental Status: She is alert and oriented to person, place, and time.   Psychiatric:         Mood and Affect: Mood normal.      Result Review :                 Assessment " and Plan    Diagnoses and all orders for this visit:    1. Obstructive sleep apnea syndrome (Primary)      Continue CPAP 8-12  The patient is compliant with and benefiting from PAP therapy.      Follow Up   Return in about 1 year (around 10/19/2024).    Patient was given instructions and counseling regarding her condition or for health maintenance advice. Please see specific information pulled into the AVS if appropriate.       This document has been electronically signed by Joseph Seipel, MD on October 19, 2023 16:34 EDT

## 2023-10-19 ENCOUNTER — OFFICE VISIT (OUTPATIENT)
Dept: NEUROLOGY | Facility: CLINIC | Age: 63
End: 2023-10-19
Payer: MEDICAID

## 2023-10-19 VITALS
BODY MASS INDEX: 43.4 KG/M2 | HEIGHT: 69 IN | WEIGHT: 293 LBS | SYSTOLIC BLOOD PRESSURE: 151 MMHG | DIASTOLIC BLOOD PRESSURE: 79 MMHG | HEART RATE: 56 BPM

## 2023-10-19 DIAGNOSIS — G47.33 OBSTRUCTIVE SLEEP APNEA SYNDROME: Primary | ICD-10-CM

## 2023-10-19 PROBLEM — Z86.010 HISTORY OF COLONIC POLYPS: Status: ACTIVE | Noted: 2023-10-19

## 2023-10-19 PROBLEM — Z86.0100 HISTORY OF COLONIC POLYPS: Status: ACTIVE | Noted: 2023-10-19

## 2023-10-19 PROCEDURE — 1159F MED LIST DOCD IN RCRD: CPT | Performed by: PSYCHIATRY & NEUROLOGY

## 2023-10-19 PROCEDURE — 99213 OFFICE O/P EST LOW 20 MIN: CPT | Performed by: PSYCHIATRY & NEUROLOGY

## 2023-10-19 PROCEDURE — 1160F RVW MEDS BY RX/DR IN RCRD: CPT | Performed by: PSYCHIATRY & NEUROLOGY

## 2023-10-19 PROCEDURE — 3077F SYST BP >= 140 MM HG: CPT | Performed by: PSYCHIATRY & NEUROLOGY

## 2023-10-19 PROCEDURE — 3078F DIAST BP <80 MM HG: CPT | Performed by: PSYCHIATRY & NEUROLOGY

## 2024-09-11 ENCOUNTER — OFFICE (AMBULATORY)
Dept: URBAN - METROPOLITAN AREA CLINIC 64 | Facility: CLINIC | Age: 64
End: 2024-09-11
Payer: MEDICAID

## 2024-09-11 ENCOUNTER — OFFICE (AMBULATORY)
Age: 64
End: 2024-09-11
Payer: MEDICAID

## 2024-09-11 VITALS
DIASTOLIC BLOOD PRESSURE: 81 MMHG | WEIGHT: 293 LBS | WEIGHT: 293 LBS | WEIGHT: 293 LBS | HEIGHT: 69 IN | DIASTOLIC BLOOD PRESSURE: 81 MMHG | HEIGHT: 69 IN | HEART RATE: 57 BPM | SYSTOLIC BLOOD PRESSURE: 144 MMHG | HEIGHT: 69 IN | SYSTOLIC BLOOD PRESSURE: 144 MMHG | HEIGHT: 69 IN | WEIGHT: 293 LBS | HEART RATE: 57 BPM | WEIGHT: 293 LBS | HEART RATE: 57 BPM | WEIGHT: 293 LBS | WEIGHT: 293 LBS | HEIGHT: 69 IN | DIASTOLIC BLOOD PRESSURE: 81 MMHG | DIASTOLIC BLOOD PRESSURE: 81 MMHG | DIASTOLIC BLOOD PRESSURE: 81 MMHG | DIASTOLIC BLOOD PRESSURE: 81 MMHG | HEIGHT: 69 IN | SYSTOLIC BLOOD PRESSURE: 144 MMHG | HEIGHT: 69 IN | DIASTOLIC BLOOD PRESSURE: 81 MMHG | HEART RATE: 57 BPM | HEART RATE: 57 BPM | HEART RATE: 57 BPM | SYSTOLIC BLOOD PRESSURE: 144 MMHG | SYSTOLIC BLOOD PRESSURE: 144 MMHG | SYSTOLIC BLOOD PRESSURE: 144 MMHG | HEART RATE: 57 BPM | SYSTOLIC BLOOD PRESSURE: 144 MMHG

## 2024-09-11 DIAGNOSIS — R11.2 NAUSEA WITH VOMITING, UNSPECIFIED: ICD-10-CM

## 2024-09-11 DIAGNOSIS — K21.9 GASTRO-ESOPHAGEAL REFLUX DISEASE WITHOUT ESOPHAGITIS: ICD-10-CM

## 2024-09-11 DIAGNOSIS — Z86.010 PERSONAL HISTORY OF COLON POLYPS: ICD-10-CM

## 2024-09-11 PROCEDURE — 99204 OFFICE O/P NEW MOD 45 MIN: CPT | Performed by: INTERNAL MEDICINE

## 2024-09-11 RX ORDER — METOCLOPRAMIDE 5 MG/1
10 TABLET ORAL
Qty: 60 | Refills: 11 | Status: ACTIVE
Start: 2024-09-11

## 2024-09-11 RX ORDER — ONDANSETRON 4 MG/1
12 TABLET, ORALLY DISINTEGRATING ORAL
Qty: 12 | Refills: 3 | Status: ACTIVE
Start: 2024-09-11

## 2024-10-22 NOTE — PROGRESS NOTES
"Chief Complaint  Sleep Apnea    Subjective          Tammie Whitehead presents to [facility] NEUROLOGY  History of Present Illness  RAFAEL  f/u for CPAP compliance, patient states she is benefiting from pap therapy,she uses nasal pillows and goes through [employer] for supplies.     Sleep testing history:    On NPSG at [facility] , 04/18/2017 patient had Severe obstructive sleep apnea syndrome with apnea-hypopnea index of 47 per sleep hour,     PAP download:  The patient is on CPAP therapy at 8-12 cm/H2O.   Data indicates Excellent compliance. With 100% usage for more than 4 hours with an average usage of 11 hours 38 minutes. AHI down to 2.1 .  Average pressures 10.1.  Average large leak 25min.     The patient's hypersomnia has resolved       [place] Sleepiness Scale:  Sitting and reading JS [place] Sleepiness: 0 WatchingTV JS [place] Sleepiness: 0  Sitting, inactive, in a public place JS [place] Sleepiness: 0  As a passenger in a car for 1 hour w/o a break  JS [place] Sleepiness: 1  Lying down to rest in the afternoon  JS [place] Sleepiness: 2  Sitting and talking to someone  JS [place] Sleepiness: 0  Sitting quietly after a lunch  JS [place] Sleepiness: 0  In a car, while stopped for traffic or a light  JS [place] Sleepiness: 0  Total 3    Review of Systems   Constitutional:  Positive for fatigue.   Respiratory:  Positive for apnea and shortness of breath.    Endocrine: Positive for heat intolerance.   Genitourinary:  Positive for frequency.   Musculoskeletal:  Positive for gait problem, joint swelling, neck pain and neck stiffness.   Psychiatric/Behavioral:  Positive for agitation and sleep disturbance. The patient is nervous/anxious.          Objective   Vital Signs:   /77 Comment: PT STATES SHE FORGOT TO TAKE BP MED YET  Pulse 96   Resp 20   Ht 175.3 cm (69\")   Wt (!) 146 kg (322 lb)   BMI 47.55 kg/m²     Physical Exam  Vitals reviewed.   Constitutional:       Appearance: She is normal weight. "   Cardiovascular:      Rate and Rhythm: Normal rate.   Pulmonary:      Effort: Pulmonary effort is normal.   Neurological:      General: No focal deficit present.      Mental Status: She is alert and oriented to person, place, and time.   Psychiatric:         Mood and Affect: Mood normal.        Result Review :                 Assessment and Plan    Diagnoses and all orders for this visit:    1. Obstructive sleep apnea syndrome (Primary)  -     PAP Therapy      Continue CPAP at 8-12  The patient is compliant with and benefiting from PAP therapy.      Follow Up   Return in about 1 year (around 10/24/2025).    Patient was given instructions and counseling regarding her condition or for health maintenance advice. Please see specific information pulled into the AVS if appropriate.       This document has been electronically signed by Joseph Seipel, MD on October 24, 2024 15:52 EDT

## 2024-10-24 ENCOUNTER — OFFICE VISIT (OUTPATIENT)
Dept: NEUROLOGY | Facility: CLINIC | Age: 64
End: 2024-10-24
Payer: MEDICAID

## 2024-10-24 VITALS
HEART RATE: 96 BPM | SYSTOLIC BLOOD PRESSURE: 179 MMHG | RESPIRATION RATE: 20 BRPM | HEIGHT: 69 IN | BODY MASS INDEX: 43.4 KG/M2 | WEIGHT: 293 LBS | DIASTOLIC BLOOD PRESSURE: 77 MMHG

## 2024-10-24 DIAGNOSIS — G47.33 OBSTRUCTIVE SLEEP APNEA SYNDROME: Primary | ICD-10-CM

## 2024-10-24 PROCEDURE — 3078F DIAST BP <80 MM HG: CPT | Performed by: PSYCHIATRY & NEUROLOGY

## 2024-10-24 PROCEDURE — 3077F SYST BP >= 140 MM HG: CPT | Performed by: PSYCHIATRY & NEUROLOGY

## 2024-10-24 PROCEDURE — 99213 OFFICE O/P EST LOW 20 MIN: CPT | Performed by: PSYCHIATRY & NEUROLOGY

## 2024-10-24 PROCEDURE — 1160F RVW MEDS BY RX/DR IN RCRD: CPT | Performed by: PSYCHIATRY & NEUROLOGY

## 2024-10-24 PROCEDURE — 1159F MED LIST DOCD IN RCRD: CPT | Performed by: PSYCHIATRY & NEUROLOGY

## 2025-05-09 ENCOUNTER — TELEPHONE (OUTPATIENT)
Dept: NEUROLOGY | Facility: CLINIC | Age: 65
End: 2025-05-09
Payer: MEDICAID

## 2025-05-09 NOTE — TELEPHONE ENCOUNTER
PATIENT CALLING TO ADVISE SHE HAS LOST @ 60 LBS AND NOW THE SETTINGS ON HER CPAP NEEDS TO BE ADJUSTED.    PLEASE ADVISE PATIENT

## 2025-06-25 ENCOUNTER — ON CAMPUS - OUTPATIENT (AMBULATORY)
Dept: URBAN - METROPOLITAN AREA HOSPITAL 77 | Facility: HOSPITAL | Age: 65
End: 2025-06-25
Payer: MEDICAID

## 2025-06-25 DIAGNOSIS — K21.00 GASTRO-ESOPHAGEAL REFLUX DISEASE WITH ESOPHAGITIS, WITHOUT B: ICD-10-CM

## 2025-06-25 DIAGNOSIS — K44.9 DIAPHRAGMATIC HERNIA WITHOUT OBSTRUCTION OR GANGRENE: ICD-10-CM

## 2025-06-25 DIAGNOSIS — Z86.0100 PERSONAL HISTORY OF COLON POLYPS, UNSPECIFIED: ICD-10-CM

## 2025-06-25 DIAGNOSIS — K57.30 DIVERTICULOSIS OF LARGE INTESTINE WITHOUT PERFORATION OR ABS: ICD-10-CM

## 2025-06-25 DIAGNOSIS — R13.10 DYSPHAGIA, UNSPECIFIED: ICD-10-CM

## 2025-06-25 DIAGNOSIS — Z09 ENCOUNTER FOR FOLLOW-UP EXAMINATION AFTER COMPLETED TREATMEN: ICD-10-CM

## 2025-06-25 DIAGNOSIS — D12.3 BENIGN NEOPLASM OF TRANSVERSE COLON: ICD-10-CM

## 2025-06-25 PROCEDURE — 43235 EGD DIAGNOSTIC BRUSH WASH: CPT | Performed by: INTERNAL MEDICINE

## 2025-06-25 PROCEDURE — 45385 COLONOSCOPY W/LESION REMOVAL: CPT | Mod: 33 | Performed by: INTERNAL MEDICINE

## 2025-06-25 PROCEDURE — 43450 DILATE ESOPHAGUS 1/MULT PASS: CPT | Performed by: INTERNAL MEDICINE

## 2025-07-16 ENCOUNTER — APPOINTMENT (OUTPATIENT)
Dept: CARDIOLOGY | Facility: HOSPITAL | Age: 65
End: 2025-07-16
Payer: OTHER GOVERNMENT

## 2025-07-16 ENCOUNTER — APPOINTMENT (OUTPATIENT)
Dept: GENERAL RADIOLOGY | Facility: HOSPITAL | Age: 65
End: 2025-07-16
Payer: OTHER GOVERNMENT

## 2025-07-16 ENCOUNTER — HOSPITAL ENCOUNTER (INPATIENT)
Facility: HOSPITAL | Age: 65
LOS: 2 days | Discharge: HOME | End: 2025-07-18
Attending: EMERGENCY MEDICINE | Admitting: STUDENT IN AN ORGANIZED HEALTH CARE EDUCATION/TRAINING PROGRAM
Payer: OTHER GOVERNMENT

## 2025-07-16 DIAGNOSIS — I42.9 CARDIOMYOPATHY, UNSPECIFIED TYPE: ICD-10-CM

## 2025-07-16 DIAGNOSIS — I21.4 NSTEMI (NON-ST ELEVATED MYOCARDIAL INFARCTION): Primary | ICD-10-CM

## 2025-07-16 LAB
ALBUMIN SERPL-MCNC: 4.3 G/DL (ref 3.5–5.2)
ALBUMIN/GLOB SERPL: 1.8 G/DL
ALP SERPL-CCNC: 87 U/L (ref 39–117)
ALT SERPL W P-5'-P-CCNC: 51 U/L (ref 1–33)
ANION GAP SERPL CALCULATED.3IONS-SCNC: 11.3 MMOL/L (ref 5–15)
AORTIC DIMENSIONLESS INDEX: 0.72 (DI)
APTT PPP: 26.1 SECONDS (ref 22.7–35.4)
AST SERPL-CCNC: 41 U/L (ref 1–32)
AV MEAN PRESS GRAD SYS DOP V1V2: 5.1 MMHG
AV VMAX SYS DOP: 155.1 CM/SEC
BACTERIA UR QL AUTO: ABNORMAL /HPF
BASOPHILS # BLD AUTO: 0.05 10*3/MM3 (ref 0–0.2)
BASOPHILS NFR BLD AUTO: 0.6 % (ref 0–1.5)
BH CV ECHO MEAS - AI P1/2T: 491 MSEC
BH CV ECHO MEAS - AO MAX PG: 9.6 MMHG
BH CV ECHO MEAS - AO V2 VTI: 40.3 CM
BH CV ECHO MEAS - AVA(I,D): 2.5 CM2
BH CV ECHO MEAS - EDV(CUBED): 201 ML
BH CV ECHO MEAS - EDV(MOD-SP4): 116 ML
BH CV ECHO MEAS - EF(MOD-SP4): 61.9 %
BH CV ECHO MEAS - ESV(CUBED): 58.1 ML
BH CV ECHO MEAS - ESV(MOD-SP4): 44.2 ML
BH CV ECHO MEAS - FS: 33.9 %
BH CV ECHO MEAS - IVS/LVPW: 1.21 CM
BH CV ECHO MEAS - IVSD: 1.16 CM
BH CV ECHO MEAS - LA DIMENSION: 4.5 CM
BH CV ECHO MEAS - LAT PEAK E' VEL: 8.5 CM/SEC
BH CV ECHO MEAS - LV DIASTOLIC VOL/BSA (35-75): 48.2 CM2
BH CV ECHO MEAS - LV MASS(C)D: 256.6 GRAMS
BH CV ECHO MEAS - LV MAX PG: 4.8 MMHG
BH CV ECHO MEAS - LV MEAN PG: 2.6 MMHG
BH CV ECHO MEAS - LV SYSTOLIC VOL/BSA (12-30): 18.4 CM2
BH CV ECHO MEAS - LV V1 MAX: 110 CM/SEC
BH CV ECHO MEAS - LV V1 VTI: 29.2 CM
BH CV ECHO MEAS - LVIDD: 5.9 CM
BH CV ECHO MEAS - LVIDS: 3.9 CM
BH CV ECHO MEAS - LVOT AREA: 3.5 CM2
BH CV ECHO MEAS - LVOT DIAM: 2.11 CM
BH CV ECHO MEAS - LVPWD: 0.96 CM
BH CV ECHO MEAS - MED PEAK E' VEL: 10 CM/SEC
BH CV ECHO MEAS - MR MAX PG: 85.2 MMHG
BH CV ECHO MEAS - MR MAX VEL: 461.6 CM/SEC
BH CV ECHO MEAS - MV A MAX VEL: 77 CM/SEC
BH CV ECHO MEAS - MV DEC SLOPE: 192.1 CM/SEC2
BH CV ECHO MEAS - MV DEC TIME: 0.19 SEC
BH CV ECHO MEAS - MV E MAX VEL: 105.9 CM/SEC
BH CV ECHO MEAS - MV E/A: 1.37
BH CV ECHO MEAS - MV MAX PG: 6.6 MMHG
BH CV ECHO MEAS - MV MEAN PG: 2.31 MMHG
BH CV ECHO MEAS - MV P1/2T: 201.1 MSEC
BH CV ECHO MEAS - MV V2 VTI: 56.3 CM
BH CV ECHO MEAS - MVA(P1/2T): 1.09 CM2
BH CV ECHO MEAS - MVA(VTI): 1.82 CM2
BH CV ECHO MEAS - PA ACC TIME: 0.14 SEC
BH CV ECHO MEAS - PA V2 MAX: 99.8 CM/SEC
BH CV ECHO MEAS - RAP SYSTOLE: 3 MMHG
BH CV ECHO MEAS - RV MAX PG: 1.83 MMHG
BH CV ECHO MEAS - RV V1 MAX: 67.6 CM/SEC
BH CV ECHO MEAS - RV V1 VTI: 15.6 CM
BH CV ECHO MEAS - RVDD: 3.5 CM
BH CV ECHO MEAS - RVSP: 40.2 MMHG
BH CV ECHO MEAS - SV(LVOT): 102.5 ML
BH CV ECHO MEAS - SV(MOD-SP4): 71.8 ML
BH CV ECHO MEAS - SVI(LVOT): 42.6 ML/M2
BH CV ECHO MEAS - SVI(MOD-SP4): 29.8 ML/M2
BH CV ECHO MEAS - TAPSE (>1.6): 2.6 CM
BH CV ECHO MEAS - TR MAX PG: 37.2 MMHG
BH CV ECHO MEAS - TR MAX VEL: 304.8 CM/SEC
BH CV ECHO MEASUREMENTS AVERAGE E/E' RATIO: 11.45
BH CV XLRA - TDI S': 15.2 CM/SEC
BILIRUB SERPL-MCNC: 0.5 MG/DL (ref 0–1.2)
BILIRUB UR QL STRIP: NEGATIVE
BUN SERPL-MCNC: 15.7 MG/DL (ref 8–23)
BUN/CREAT SERPL: 17.1 (ref 7–25)
CALCIUM SPEC-SCNC: 9.7 MG/DL (ref 8.6–10.5)
CHLORIDE SERPL-SCNC: 106 MMOL/L (ref 98–107)
CHOLEST SERPL-MCNC: 130 MG/DL (ref 0–200)
CLARITY UR: CLEAR
CO2 SERPL-SCNC: 24.7 MMOL/L (ref 22–29)
COLOR UR: YELLOW
CREAT SERPL-MCNC: 0.92 MG/DL (ref 0.57–1)
D DIMER PPP FEU-MCNC: 0.63 MCGFEU/ML (ref 0–0.64)
DEPRECATED RDW RBC AUTO: 47.5 FL (ref 37–54)
EGFRCR SERPLBLD CKD-EPI 2021: 69.7 ML/MIN/1.73
EOSINOPHIL # BLD AUTO: 0.43 10*3/MM3 (ref 0–0.4)
EOSINOPHIL NFR BLD AUTO: 5.4 % (ref 0.3–6.2)
ERYTHROCYTE [DISTWIDTH] IN BLOOD BY AUTOMATED COUNT: 13.5 % (ref 12.3–15.4)
GEN 5 1HR TROPONIN T REFLEX: 539 NG/L
GLOBULIN UR ELPH-MCNC: 2.4 GM/DL
GLUCOSE SERPL-MCNC: 134 MG/DL (ref 65–99)
GLUCOSE UR STRIP-MCNC: NEGATIVE MG/DL
HBA1C MFR BLD: 5.92 % (ref 4.8–5.6)
HCT VFR BLD AUTO: 37.4 % (ref 34–46.6)
HDLC SERPL-MCNC: 40 MG/DL (ref 40–60)
HGB BLD-MCNC: 11.9 G/DL (ref 12–15.9)
HGB UR QL STRIP.AUTO: NEGATIVE
HOLD SPECIMEN: NORMAL
HOLD SPECIMEN: NORMAL
HYALINE CASTS UR QL AUTO: ABNORMAL /LPF
IMM GRANULOCYTES # BLD AUTO: 0.01 10*3/MM3 (ref 0–0.05)
IMM GRANULOCYTES NFR BLD AUTO: 0.1 % (ref 0–0.5)
INR PPP: 0.98 (ref 0.9–1.1)
KETONES UR QL STRIP: NEGATIVE
LDLC SERPL CALC-MCNC: 71 MG/DL (ref 0–100)
LDLC/HDLC SERPL: 1.74 {RATIO}
LEFT ATRIUM VOLUME INDEX: 31.2 ML/M2
LEUKOCYTE ESTERASE UR QL STRIP.AUTO: ABNORMAL
LIPASE SERPL-CCNC: 9 U/L (ref 13–60)
LV EF 3D SEGMENTATION: 64 %
LV EF BIPLANE MOD: 62 %
LYMPHOCYTES # BLD AUTO: 2.97 10*3/MM3 (ref 0.7–3.1)
LYMPHOCYTES NFR BLD AUTO: 37.2 % (ref 19.6–45.3)
MAGNESIUM SERPL-MCNC: 2 MG/DL (ref 1.6–2.4)
MCH RBC QN AUTO: 30.5 PG (ref 26.6–33)
MCHC RBC AUTO-ENTMCNC: 31.8 G/DL (ref 31.5–35.7)
MCV RBC AUTO: 95.9 FL (ref 79–97)
MONOCYTES # BLD AUTO: 0.44 10*3/MM3 (ref 0.1–0.9)
MONOCYTES NFR BLD AUTO: 5.5 % (ref 5–12)
NEUTROPHILS NFR BLD AUTO: 4.08 10*3/MM3 (ref 1.7–7)
NEUTROPHILS NFR BLD AUTO: 51.2 % (ref 42.7–76)
NITRITE UR QL STRIP: NEGATIVE
NRBC BLD AUTO-RTO: 0 /100 WBC (ref 0–0.2)
PH UR STRIP.AUTO: 6 [PH] (ref 5–8)
PLATELET # BLD AUTO: 256 10*3/MM3 (ref 140–450)
PMV BLD AUTO: 10.4 FL (ref 6–12)
POTASSIUM SERPL-SCNC: 3.9 MMOL/L (ref 3.5–5.2)
PROT SERPL-MCNC: 6.7 G/DL (ref 6–8.5)
PROT UR QL STRIP: NEGATIVE
PROTHROMBIN TIME: 12.9 SECONDS (ref 11.7–14.2)
RBC # BLD AUTO: 3.9 10*6/MM3 (ref 3.77–5.28)
RBC # UR STRIP: ABNORMAL /HPF
REF LAB TEST METHOD: ABNORMAL
SINUS: 3 CM
SODIUM SERPL-SCNC: 142 MMOL/L (ref 136–145)
SP GR UR STRIP: 1.02 (ref 1–1.03)
SQUAMOUS #/AREA URNS HPF: ABNORMAL /HPF
STJ: 2.8 CM
TRIGL SERPL-MCNC: 102 MG/DL (ref 0–150)
TROPONIN T % DELTA: 27
TROPONIN T NUMERIC DELTA: 114 NG/L
TROPONIN T SERPL HS-MCNC: 425 NG/L
TSH SERPL DL<=0.05 MIU/L-ACNC: 0.93 UIU/ML (ref 0.27–4.2)
UFH PPP CHRO-ACNC: 0.12 IU/ML
UFH PPP CHRO-ACNC: <0.1 IU/ML
UROBILINOGEN UR QL STRIP: ABNORMAL
VLDLC SERPL-MCNC: 19 MG/DL (ref 5–40)
WBC # UR STRIP: ABNORMAL /HPF
WBC NRBC COR # BLD AUTO: 7.98 10*3/MM3 (ref 3.4–10.8)

## 2025-07-16 PROCEDURE — 83690 ASSAY OF LIPASE: CPT | Performed by: EMERGENCY MEDICINE

## 2025-07-16 PROCEDURE — B2111ZZ FLUOROSCOPY OF MULTIPLE CORONARY ARTERIES USING LOW OSMOLAR CONTRAST: ICD-10-PCS | Performed by: INTERNAL MEDICINE

## 2025-07-16 PROCEDURE — 83735 ASSAY OF MAGNESIUM: CPT | Performed by: EMERGENCY MEDICINE

## 2025-07-16 PROCEDURE — 99153 MOD SED SAME PHYS/QHP EA: CPT | Performed by: INTERNAL MEDICINE

## 2025-07-16 PROCEDURE — 85379 FIBRIN DEGRADATION QUANT: CPT | Performed by: EMERGENCY MEDICINE

## 2025-07-16 PROCEDURE — 25510000001 IOPAMIDOL PER 1 ML: Performed by: INTERNAL MEDICINE

## 2025-07-16 PROCEDURE — 85520 HEPARIN ASSAY: CPT | Performed by: EMERGENCY MEDICINE

## 2025-07-16 PROCEDURE — 80053 COMPREHEN METABOLIC PANEL: CPT | Performed by: EMERGENCY MEDICINE

## 2025-07-16 PROCEDURE — C1894 INTRO/SHEATH, NON-LASER: HCPCS | Performed by: INTERNAL MEDICINE

## 2025-07-16 PROCEDURE — 25810000003 SODIUM CHLORIDE 0.9 % SOLUTION: Performed by: INTERNAL MEDICINE

## 2025-07-16 PROCEDURE — 25010000002 HEPARIN (PORCINE) PER 1000 UNITS: Performed by: INTERNAL MEDICINE

## 2025-07-16 PROCEDURE — 25010000002 ONDANSETRON PER 1 MG: Performed by: EMERGENCY MEDICINE

## 2025-07-16 PROCEDURE — 36415 COLL VENOUS BLD VENIPUNCTURE: CPT

## 2025-07-16 PROCEDURE — 93356 MYOCRD STRAIN IMG SPCKL TRCK: CPT

## 2025-07-16 PROCEDURE — 25010000002 LIDOCAINE 1 % SOLUTION: Performed by: INTERNAL MEDICINE

## 2025-07-16 PROCEDURE — 83036 HEMOGLOBIN GLYCOSYLATED A1C: CPT

## 2025-07-16 PROCEDURE — 94799 UNLISTED PULMONARY SVC/PX: CPT

## 2025-07-16 PROCEDURE — 25010000002 NITROGLYCERIN 200 MCG/ML SOLUTION: Performed by: EMERGENCY MEDICINE

## 2025-07-16 PROCEDURE — 4A023N7 MEASUREMENT OF CARDIAC SAMPLING AND PRESSURE, LEFT HEART, PERCUTANEOUS APPROACH: ICD-10-PCS | Performed by: INTERNAL MEDICINE

## 2025-07-16 PROCEDURE — 93005 ELECTROCARDIOGRAM TRACING: CPT | Performed by: EMERGENCY MEDICINE

## 2025-07-16 PROCEDURE — 93005 ELECTROCARDIOGRAM TRACING: CPT | Performed by: INTERNAL MEDICINE

## 2025-07-16 PROCEDURE — 84484 ASSAY OF TROPONIN QUANT: CPT | Performed by: EMERGENCY MEDICINE

## 2025-07-16 PROCEDURE — 99291 CRITICAL CARE FIRST HOUR: CPT

## 2025-07-16 PROCEDURE — 99254 IP/OBS CNSLTJ NEW/EST MOD 60: CPT | Performed by: INTERNAL MEDICINE

## 2025-07-16 PROCEDURE — 25010000002 HEPARIN (PORCINE) 25000-0.45 UT/250ML-% SOLUTION: Performed by: EMERGENCY MEDICINE

## 2025-07-16 PROCEDURE — 99152 MOD SED SAME PHYS/QHP 5/>YRS: CPT | Performed by: INTERNAL MEDICINE

## 2025-07-16 PROCEDURE — 93458 L HRT ARTERY/VENTRICLE ANGIO: CPT | Performed by: INTERNAL MEDICINE

## 2025-07-16 PROCEDURE — 25010000002 NICARDIPINE 2.5 MG/ML SOLUTION: Performed by: INTERNAL MEDICINE

## 2025-07-16 PROCEDURE — 85025 COMPLETE CBC W/AUTO DIFF WBC: CPT | Performed by: EMERGENCY MEDICINE

## 2025-07-16 PROCEDURE — 25010000002 MIDAZOLAM PER 1 MG: Performed by: INTERNAL MEDICINE

## 2025-07-16 PROCEDURE — 93356 MYOCRD STRAIN IMG SPCKL TRCK: CPT | Performed by: INTERNAL MEDICINE

## 2025-07-16 PROCEDURE — 80061 LIPID PANEL: CPT

## 2025-07-16 PROCEDURE — 25010000002 FENTANYL CITRATE (PF) 100 MCG/2ML SOLUTION: Performed by: INTERNAL MEDICINE

## 2025-07-16 PROCEDURE — 85610 PROTHROMBIN TIME: CPT | Performed by: EMERGENCY MEDICINE

## 2025-07-16 PROCEDURE — 93306 TTE W/DOPPLER COMPLETE: CPT | Performed by: INTERNAL MEDICINE

## 2025-07-16 PROCEDURE — C1769 GUIDE WIRE: HCPCS | Performed by: INTERNAL MEDICINE

## 2025-07-16 PROCEDURE — 71045 X-RAY EXAM CHEST 1 VIEW: CPT

## 2025-07-16 PROCEDURE — 85730 THROMBOPLASTIN TIME PARTIAL: CPT | Performed by: EMERGENCY MEDICINE

## 2025-07-16 PROCEDURE — 25010000002 NITROGLYCERIN 5 MG/ML SOLUTION: Performed by: INTERNAL MEDICINE

## 2025-07-16 PROCEDURE — 93306 TTE W/DOPPLER COMPLETE: CPT

## 2025-07-16 PROCEDURE — 84443 ASSAY THYROID STIM HORMONE: CPT

## 2025-07-16 PROCEDURE — 81001 URINALYSIS AUTO W/SCOPE: CPT | Performed by: EMERGENCY MEDICINE

## 2025-07-16 RX ORDER — PANTOPRAZOLE SODIUM 40 MG/1
40 TABLET, DELAYED RELEASE ORAL
Status: DISCONTINUED | OUTPATIENT
Start: 2025-07-16 | End: 2025-07-18 | Stop reason: HOSPADM

## 2025-07-16 RX ORDER — ACETAMINOPHEN 160 MG/5ML
650 SOLUTION ORAL EVERY 4 HOURS PRN
Status: DISCONTINUED | OUTPATIENT
Start: 2025-07-16 | End: 2025-07-18 | Stop reason: HOSPADM

## 2025-07-16 RX ORDER — ACETAMINOPHEN 325 MG/1
650 TABLET ORAL EVERY 4 HOURS PRN
Status: DISCONTINUED | OUTPATIENT
Start: 2025-07-16 | End: 2025-07-17

## 2025-07-16 RX ORDER — IOPAMIDOL 755 MG/ML
INJECTION, SOLUTION INTRAVASCULAR
Status: DISCONTINUED | OUTPATIENT
Start: 2025-07-16 | End: 2025-07-16 | Stop reason: HOSPADM

## 2025-07-16 RX ORDER — BISACODYL 5 MG/1
5 TABLET, DELAYED RELEASE ORAL DAILY PRN
Status: DISCONTINUED | OUTPATIENT
Start: 2025-07-16 | End: 2025-07-18 | Stop reason: HOSPADM

## 2025-07-16 RX ORDER — ATORVASTATIN CALCIUM 20 MG/1
20 TABLET, FILM COATED ORAL DAILY
Status: DISCONTINUED | OUTPATIENT
Start: 2025-07-16 | End: 2025-07-18 | Stop reason: HOSPADM

## 2025-07-16 RX ORDER — METOPROLOL SUCCINATE 25 MG/1
25 TABLET, EXTENDED RELEASE ORAL DAILY
Status: DISCONTINUED | OUTPATIENT
Start: 2025-07-16 | End: 2025-07-18 | Stop reason: HOSPADM

## 2025-07-16 RX ORDER — NICARDIPINE HYDROCHLORIDE 2.5 MG/ML
INJECTION INTRAVENOUS
Status: DISCONTINUED | OUTPATIENT
Start: 2025-07-16 | End: 2025-07-16 | Stop reason: HOSPADM

## 2025-07-16 RX ORDER — ONDANSETRON 2 MG/ML
4 INJECTION INTRAMUSCULAR; INTRAVENOUS EVERY 6 HOURS PRN
Status: DISCONTINUED | OUTPATIENT
Start: 2025-07-16 | End: 2025-07-18 | Stop reason: HOSPADM

## 2025-07-16 RX ORDER — LORATADINE 10 MG/1
10 TABLET ORAL DAILY
COMMUNITY

## 2025-07-16 RX ORDER — ONDANSETRON 4 MG/1
4 TABLET, ORALLY DISINTEGRATING ORAL EVERY 6 HOURS PRN
Status: DISCONTINUED | OUTPATIENT
Start: 2025-07-16 | End: 2025-07-18 | Stop reason: HOSPADM

## 2025-07-16 RX ORDER — ASPIRIN 81 MG/1
81 TABLET, CHEWABLE ORAL DAILY
Status: DISCONTINUED | OUTPATIENT
Start: 2025-07-17 | End: 2025-07-18 | Stop reason: HOSPADM

## 2025-07-16 RX ORDER — HYDROCODONE BITARTRATE AND ACETAMINOPHEN 10; 325 MG/1; MG/1
1 TABLET ORAL EVERY 6 HOURS PRN
Refills: 0 | Status: DISCONTINUED | OUTPATIENT
Start: 2025-07-16 | End: 2025-07-18 | Stop reason: HOSPADM

## 2025-07-16 RX ORDER — AMOXICILLIN 250 MG
2 CAPSULE ORAL 2 TIMES DAILY PRN
Status: DISCONTINUED | OUTPATIENT
Start: 2025-07-16 | End: 2025-07-18 | Stop reason: HOSPADM

## 2025-07-16 RX ORDER — IPRATROPIUM BROMIDE AND ALBUTEROL SULFATE 2.5; .5 MG/3ML; MG/3ML
3 SOLUTION RESPIRATORY (INHALATION) EVERY 4 HOURS PRN
Status: DISCONTINUED | OUTPATIENT
Start: 2025-07-16 | End: 2025-07-18 | Stop reason: HOSPADM

## 2025-07-16 RX ORDER — ASPIRIN 81 MG/1
324 TABLET, CHEWABLE ORAL ONCE
Status: DISCONTINUED | OUTPATIENT
Start: 2025-07-16 | End: 2025-07-18 | Stop reason: HOSPADM

## 2025-07-16 RX ORDER — ONDANSETRON 2 MG/ML
4 INJECTION INTRAMUSCULAR; INTRAVENOUS ONCE
Status: COMPLETED | OUTPATIENT
Start: 2025-07-16 | End: 2025-07-16

## 2025-07-16 RX ORDER — SODIUM CHLORIDE 9 MG/ML
INJECTION, SOLUTION INTRAVENOUS
Status: COMPLETED | OUTPATIENT
Start: 2025-07-16 | End: 2025-07-16

## 2025-07-16 RX ORDER — LIDOCAINE HYDROCHLORIDE 10 MG/ML
INJECTION, SOLUTION INFILTRATION; PERINEURAL
Status: DISCONTINUED | OUTPATIENT
Start: 2025-07-16 | End: 2025-07-16 | Stop reason: HOSPADM

## 2025-07-16 RX ORDER — MULTIVITAMIN WITH IRON
1000 TABLET ORAL DAILY
Status: DISCONTINUED | OUTPATIENT
Start: 2025-07-16 | End: 2025-07-18 | Stop reason: HOSPADM

## 2025-07-16 RX ORDER — BISACODYL 10 MG
10 SUPPOSITORY, RECTAL RECTAL DAILY PRN
Status: DISCONTINUED | OUTPATIENT
Start: 2025-07-16 | End: 2025-07-18 | Stop reason: HOSPADM

## 2025-07-16 RX ORDER — ATORVASTATIN CALCIUM 20 MG/1
20 TABLET, FILM COATED ORAL DAILY
COMMUNITY

## 2025-07-16 RX ORDER — FENTANYL CITRATE 50 UG/ML
INJECTION, SOLUTION INTRAMUSCULAR; INTRAVENOUS
Status: DISCONTINUED | OUTPATIENT
Start: 2025-07-16 | End: 2025-07-16 | Stop reason: HOSPADM

## 2025-07-16 RX ORDER — NITROGLYCERIN 0.4 MG/1
0.4 TABLET SUBLINGUAL
Status: DISCONTINUED | OUTPATIENT
Start: 2025-07-16 | End: 2025-07-16 | Stop reason: SDUPTHER

## 2025-07-16 RX ORDER — NITROGLYCERIN 0.4 MG/1
0.4 TABLET SUBLINGUAL
Status: DISCONTINUED | OUTPATIENT
Start: 2025-07-16 | End: 2025-07-18 | Stop reason: HOSPADM

## 2025-07-16 RX ORDER — ALLOPURINOL 300 MG/1
300 TABLET ORAL DAILY
Status: DISCONTINUED | OUTPATIENT
Start: 2025-07-16 | End: 2025-07-18 | Stop reason: HOSPADM

## 2025-07-16 RX ORDER — GABAPENTIN 300 MG/1
300 CAPSULE ORAL 4 TIMES DAILY PRN
COMMUNITY

## 2025-07-16 RX ORDER — POLYETHYLENE GLYCOL 3350 17 G/17G
17 POWDER, FOR SOLUTION ORAL DAILY PRN
COMMUNITY

## 2025-07-16 RX ORDER — MIDAZOLAM HYDROCHLORIDE 1 MG/ML
INJECTION, SOLUTION INTRAMUSCULAR; INTRAVENOUS
Status: DISCONTINUED | OUTPATIENT
Start: 2025-07-16 | End: 2025-07-16 | Stop reason: HOSPADM

## 2025-07-16 RX ORDER — METOPROLOL SUCCINATE 25 MG/1
25 TABLET, EXTENDED RELEASE ORAL AS NEEDED
COMMUNITY
End: 2025-07-18 | Stop reason: HOSPADM

## 2025-07-16 RX ORDER — LEVOTHYROXINE SODIUM 112 UG/1
112 TABLET ORAL EVERY MORNING
Status: DISCONTINUED | OUTPATIENT
Start: 2025-07-16 | End: 2025-07-18 | Stop reason: HOSPADM

## 2025-07-16 RX ORDER — METOCLOPRAMIDE 5 MG/1
5 TABLET ORAL 2 TIMES DAILY PRN
COMMUNITY

## 2025-07-16 RX ORDER — NITROGLYCERIN 5 MG/ML
INJECTION, SOLUTION INTRAVENOUS
Status: DISCONTINUED | OUTPATIENT
Start: 2025-07-16 | End: 2025-07-16 | Stop reason: HOSPADM

## 2025-07-16 RX ORDER — LANOLIN ALCOHOL/MO/W.PET/CERES
1000 CREAM (GRAM) TOPICAL DAILY
COMMUNITY

## 2025-07-16 RX ORDER — SODIUM CHLORIDE 0.9 % (FLUSH) 0.9 %
10 SYRINGE (ML) INJECTION AS NEEDED
Status: DISCONTINUED | OUTPATIENT
Start: 2025-07-16 | End: 2025-07-18 | Stop reason: HOSPADM

## 2025-07-16 RX ORDER — LISINOPRIL 20 MG/1
20 TABLET ORAL
Status: DISCONTINUED | OUTPATIENT
Start: 2025-07-16 | End: 2025-07-18 | Stop reason: HOSPADM

## 2025-07-16 RX ORDER — POLYETHYLENE GLYCOL 3350 17 G/17G
17 POWDER, FOR SOLUTION ORAL DAILY PRN
Status: DISCONTINUED | OUTPATIENT
Start: 2025-07-16 | End: 2025-07-18 | Stop reason: HOSPADM

## 2025-07-16 RX ORDER — NITROGLYCERIN 20 MG/100ML
10-50 INJECTION INTRAVENOUS
Status: DISCONTINUED | OUTPATIENT
Start: 2025-07-16 | End: 2025-07-16

## 2025-07-16 RX ORDER — ACETAMINOPHEN 650 MG/1
650 SUPPOSITORY RECTAL EVERY 4 HOURS PRN
Status: DISCONTINUED | OUTPATIENT
Start: 2025-07-16 | End: 2025-07-18 | Stop reason: HOSPADM

## 2025-07-16 RX ORDER — TRAZODONE HYDROCHLORIDE 100 MG/1
200 TABLET ORAL NIGHTLY
Status: DISCONTINUED | OUTPATIENT
Start: 2025-07-16 | End: 2025-07-18 | Stop reason: HOSPADM

## 2025-07-16 RX ORDER — ACETAMINOPHEN 325 MG/1
650 TABLET ORAL EVERY 4 HOURS PRN
Status: DISCONTINUED | OUTPATIENT
Start: 2025-07-16 | End: 2025-07-18 | Stop reason: HOSPADM

## 2025-07-16 RX ORDER — LISINOPRIL 20 MG/1
20 TABLET ORAL DAILY
Status: DISCONTINUED | OUTPATIENT
Start: 2025-07-16 | End: 2025-07-16

## 2025-07-16 RX ORDER — GABAPENTIN 300 MG/1
300 CAPSULE ORAL 4 TIMES DAILY PRN
Status: DISCONTINUED | OUTPATIENT
Start: 2025-07-16 | End: 2025-07-18 | Stop reason: HOSPADM

## 2025-07-16 RX ORDER — HEPARIN SODIUM 10000 [USP'U]/100ML
7.69 INJECTION, SOLUTION INTRAVENOUS
Status: DISCONTINUED | OUTPATIENT
Start: 2025-07-16 | End: 2025-07-16

## 2025-07-16 RX ORDER — HEPARIN SODIUM 1000 [USP'U]/ML
INJECTION, SOLUTION INTRAVENOUS; SUBCUTANEOUS
Status: DISCONTINUED | OUTPATIENT
Start: 2025-07-16 | End: 2025-07-16 | Stop reason: HOSPADM

## 2025-07-16 RX ORDER — HYDROCHLOROTHIAZIDE 25 MG/1
25 TABLET ORAL
Status: DISCONTINUED | OUTPATIENT
Start: 2025-07-16 | End: 2025-07-18 | Stop reason: HOSPADM

## 2025-07-16 RX ORDER — METOPROLOL SUCCINATE 25 MG/1
25 TABLET, EXTENDED RELEASE ORAL AS NEEDED
Status: DISCONTINUED | OUTPATIENT
Start: 2025-07-16 | End: 2025-07-18 | Stop reason: HOSPADM

## 2025-07-16 RX ADMIN — MUPIROCIN 1 APPLICATION: 20 OINTMENT TOPICAL at 17:14

## 2025-07-16 RX ADMIN — ALLOPURINOL 300 MG: 300 TABLET ORAL at 13:16

## 2025-07-16 RX ADMIN — TRAZODONE HYDROCHLORIDE 200 MG: 100 TABLET ORAL at 21:04

## 2025-07-16 RX ADMIN — ONDANSETRON 4 MG: 2 INJECTION, SOLUTION INTRAMUSCULAR; INTRAVENOUS at 08:07

## 2025-07-16 RX ADMIN — PANTOPRAZOLE SODIUM 40 MG: 40 TABLET, DELAYED RELEASE ORAL at 16:14

## 2025-07-16 RX ADMIN — LEVOTHYROXINE SODIUM 112 MCG: 112 TABLET ORAL at 13:16

## 2025-07-16 RX ADMIN — NITROGLYCERIN 0.4 MG: 0.4 TABLET SUBLINGUAL at 08:23

## 2025-07-16 RX ADMIN — HYDROCHLOROTHIAZIDE 25 MG: 25 TABLET ORAL at 13:17

## 2025-07-16 RX ADMIN — HEPARIN SODIUM 7.69 UNITS/KG/HR: 10000 INJECTION, SOLUTION INTRAVENOUS at 09:49

## 2025-07-16 RX ADMIN — ATORVASTATIN CALCIUM 20 MG: 20 TABLET, FILM COATED ORAL at 13:16

## 2025-07-16 RX ADMIN — NITROGLYCERIN 0.4 MG: 0.4 TABLET SUBLINGUAL at 08:04

## 2025-07-16 RX ADMIN — Medication 1000 MCG: at 13:16

## 2025-07-16 RX ADMIN — GABAPENTIN 300 MG: 300 CAPSULE ORAL at 21:04

## 2025-07-16 RX ADMIN — LISINOPRIL 20 MG: 20 TABLET ORAL at 13:16

## 2025-07-16 RX ADMIN — HYDROCODONE BITARTRATE AND ACETAMINOPHEN 1 TABLET: 10; 325 TABLET ORAL at 21:04

## 2025-07-16 RX ADMIN — NITROGLYCERIN 10 MCG/MIN: 20 INJECTION INTRAVENOUS at 08:53
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
## 2025-07-16 NOTE — Clinical Note
Hemostasis started on the right radial artery. R-Band was used in achieving hemostasis. Radial compression device applied to vessel. Hemostasis achieved successfully. Closure device additional comment: 13ccs air

## 2025-07-16 NOTE — PLAN OF CARE
Goal Outcome Evaluation:  Plan of Care Reviewed With: patient      Pt came in through the ED for CP that started yesterday, was found to have an NSTEMI, gave two sublingual Nitros no relief started on Niro gtt and heparin downstairs. When she came up to me she was sinus andry in the 40-50's, no CP so stopped the nitro gtt has been CP free since. Heparin AntiXA kizzy at 1350, titrated and bolused her off that. Left Heart Cath is scheduled for [facility] at 1730 with . Next Anti-XA due at 1930. Patient is up with stand by assist to go to the bathroom. Heart rate has improved to 60-70's since this morning. She had an echo done at bedside prior to the cath that showed her EF 61-65%.

## 2025-07-16 NOTE — CASE MANAGEMENT/SOCIAL WORK
Social Work Assessment   Praneeth     Patient Name: Tammie Whitehead  MRN: [id number]  Today's Date: 7/16/2025    Admit Date: 7/16/2025     Demographic Summary       Row Name 07/16/25 1559       Contact Information    Contact Information Comments LSW consulted re: concerns about lack of support system. LSW attempted to meet Pt bedside in room 3107 but unable due to staff present working with Pt. LSW will F/U on 7.16           MASHA Caldwell  Medical Social Worker

## 2025-07-16 NOTE — CASE MANAGEMENT/SOCIAL WORK
Discharge Planning Assessment  [facility]     Patient Name: Tammie Whitehead  MRN: [id number]  Today's Date: 7/16/2025    Admit Date: 7/16/2025    Plan: Home with ex-spouse. Has CPAP through [facility]   Discharge Needs Assessment       Row Name 07/16/25 0938       Living Environment    People in Home other (see comments)    Unique Family Situation Ex  Alireza    Primary Care Provided by self    Provides Primary Care For other (see comments)  Ex- Alireza    Family Caregiver if Needed none    Quality of Family Relationships other (see comments)  She states she has noone to assist her at discharge. She reports Alireza will provide transportation    Able to Return to Prior Arrangements yes       Resource/Environmental Concerns    Resource/Environmental Concerns none    Transportation Concerns none       Transition Planning    Patient/Family Anticipates Transition to home with family    Patient/Family Anticipated Services at Transition none    Transportation Anticipated family or friend will provide       Discharge Needs Assessment    Equipment Currently Used at Home cane, straight;shower chair;cpap    Concerns to be Addressed denies needs/concerns at this time    Anticipated Changes Related to Illness none    Equipment Needed After Discharge none    Current Discharge Risk lack of support system/caregiver                   Discharge Plan       Row Name 07/16/25 0940       Plan    Plan Home with ex-spouse. Has CPAP through [facility]    Patient/Family in Agreement with Plan yes    Plan Comments  spoke with patient at bedside. She reports that her ex- Alireza lives with her and she provides his care.She reports he has cancer and she helps manage his ostomy care.She states she has been teaching him how to care for it. She states he is able to manage his cares while she is hospitalized and will provide transportation for her when she is discharged. PCP confirmed; Pharmacy confirmed & updated in [facility]  "([facility]). Pt wishes to enrolled in meds to bed, and this was updated in [facility]. She denies difficulty obtaining food/medication/utilities/transportation. She reports she has CPAP through [facility]. She denies dc needs at this time, but states she has noone to assist her if needed. She does report she has a \"46 year old son\", but they are not on good terms. She reports her mom  approximately one month ago, and she was not on good terms with her. Pt tearful.Agreeable to speaking with . She denies dc needs at this time.                  Demographic Summary       Row Name 25 0937       General Information    Admission Type observation    Arrived From home    Referral Source admission list    Reason for Consult discharge planning    Preferred Language English       Contact Information    Permission Granted to Share Info With ;other (see comments)                     Functional Status       Row Name 25 0938       Functional Status    Usual Activity Tolerance good    Current Activity Tolerance good       Functional Status, IADL    Medications independent    Meal Preparation independent    Housekeeping independent    Laundry independent    Shopping independent                 Eve Jacinto RN, [facility]  Office: [phone]  Fax: [phone]  [email or web address]      I met with patient in room wearing PPE: mask    Maintained distance greater than six feet and spent </=15 minutes in the room    Eve Jacinto RN    "
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
## 2025-07-16 NOTE — Clinical Note
Level of Care: Stepdown [25]   Diagnosis: NSTEMI (non-ST elevated myocardial infarction) [288625]   Admitting Physician: ANDREW SHAVER [[id number]]   Attending Physician: ANDREW SHAVER [[id number]]

## 2025-07-16 NOTE — SIGNIFICANT NOTE
07/16/25 0928   Living Situation   Current Living Arrangements home   Potentially Unsafe Housing Conditions none   Food Insecurity   Within the past 12 months, you worried that your food would run out before you got the money to buy more. Never true   Within the past 12 months, the food you bought just didn't last and you didn't have money to get more. Never true   Transportation Needs   In the past 12 months, has lack of transportation kept you from medical appointments or from getting medications? no   In the past 12 months, has lack of transportation kept you from meetings, work, or from getting things needed for daily living? No   Utilities   In the past 12 months has the electric, gas, oil, or water company threatened to shut off services in your home? No   Abuse Screen (yes response referral indicated)   Feels Unsafe at Home or Work/School no   Feels Threatened by Someone no   Does Anyone Try to Keep You From Having Contact with Others or Doing Things Outside Your Home? no   Physical Signs of Abuse Present no   Financial Resource Strain   How hard is it for you to pay for the very basics like food, housing, medical care, and heating? Not very   Employment   Do you want help finding or keeping work or a job? I do not need or want help   Family and Community Support   If for any reason you need help with day-to-day activities such as bathing, preparing meals, shopping, managing finances, etc., do you get the help you need? I don't need any help   How often do you feel lonely or isolated from those around you? Never   Education   Preferred Language English   Do you want help with school or training? For example, starting or completing job training or getting a high school diploma, GED or equivalent No   Physical Activity   On average, how many days per week do you engage in moderate to strenuous exercise (like a brisk walk)? Pt Declined   On average, how many minutes do you engage in exercise at this level? Pt  "Declined   Alcohol Use   Q1: How often do you have a drink containing alcohol? Never   Q2: How many drinks containing alcohol do you have on a typical day when you are drinking? None   Q3: How often do you have six or more drinks on one occasion? Never   Stress   Do you feel stress - tense, restless, nervous, or anxious, or unable to sleep at night because your mind is troubled all the time - these days? Only a littl  (Pt reports mother  last month. She is stressed about that as well as the incident with her dogs. She states she feels ok overall and has [facility] therapist and doctor that she sees routinely.)   Mental Health   Little interest or pleasure in doing things Not at all   Feeling down, depressed, or hopeless Several days  (\"some days\")   Disabilities   Difficulty Concentrating, Remembering or Making Decisions no   Difficulty Managing Errands Independently no     With patient's consent, notified  Geri of need for follow-up regarding SDOH responses. Pt tearful at times during discussion.Reports her mom  about a month ago and she wasn't on good terms with her.States he didn't go to the viewing.Reports she has a 46 year old son that she is also not on good terms with. She is providing care for her ex- who has cancer. Pt reports she has [facility] therapist and doctor that she sees routinely.    Eve Jacinto RN, [facility] [place]  Office: [phone]  Fax: [phone]  [email or web address]      I met with patient in room wearing PPE: mask    Maintained distance greater than six feet and spent </=15 minutes in the room  "
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
## 2025-07-17 LAB
ANION GAP SERPL CALCULATED.3IONS-SCNC: 10.9 MMOL/L (ref 5–15)
BASOPHILS # BLD AUTO: 0.04 10*3/MM3 (ref 0–0.2)
BASOPHILS NFR BLD AUTO: 0.5 % (ref 0–1.5)
BUN SERPL-MCNC: 14.3 MG/DL (ref 8–23)
BUN/CREAT SERPL: 16.3 (ref 7–25)
CALCIUM SPEC-SCNC: 8.8 MG/DL (ref 8.6–10.5)
CHLORIDE SERPL-SCNC: 109 MMOL/L (ref 98–107)
CO2 SERPL-SCNC: 24.1 MMOL/L (ref 22–29)
CREAT SERPL-MCNC: 0.88 MG/DL (ref 0.57–1)
DEPRECATED RDW RBC AUTO: 48.7 FL (ref 37–54)
EGFRCR SERPLBLD CKD-EPI 2021: 73.5 ML/MIN/1.73
EOSINOPHIL # BLD AUTO: 0.3 10*3/MM3 (ref 0–0.4)
EOSINOPHIL NFR BLD AUTO: 3.6 % (ref 0.3–6.2)
ERYTHROCYTE [DISTWIDTH] IN BLOOD BY AUTOMATED COUNT: 13.6 % (ref 12.3–15.4)
GLUCOSE SERPL-MCNC: 109 MG/DL (ref 65–99)
HCT VFR BLD AUTO: 36.5 % (ref 34–46.6)
HGB BLD-MCNC: 11.8 G/DL (ref 12–15.9)
IMM GRANULOCYTES # BLD AUTO: 0.03 10*3/MM3 (ref 0–0.05)
IMM GRANULOCYTES NFR BLD AUTO: 0.4 % (ref 0–0.5)
LYMPHOCYTES # BLD AUTO: 2.95 10*3/MM3 (ref 0.7–3.1)
LYMPHOCYTES NFR BLD AUTO: 35.3 % (ref 19.6–45.3)
MAGNESIUM SERPL-MCNC: 2.1 MG/DL (ref 1.6–2.4)
MCH RBC QN AUTO: 31.4 PG (ref 26.6–33)
MCHC RBC AUTO-ENTMCNC: 32.3 G/DL (ref 31.5–35.7)
MCV RBC AUTO: 97.1 FL (ref 79–97)
MONOCYTES # BLD AUTO: 0.56 10*3/MM3 (ref 0.1–0.9)
MONOCYTES NFR BLD AUTO: 6.7 % (ref 5–12)
NEUTROPHILS NFR BLD AUTO: 4.48 10*3/MM3 (ref 1.7–7)
NEUTROPHILS NFR BLD AUTO: 53.5 % (ref 42.7–76)
NRBC BLD AUTO-RTO: 0 /100 WBC (ref 0–0.2)
PLATELET # BLD AUTO: 242 10*3/MM3 (ref 140–450)
PMV BLD AUTO: 10.7 FL (ref 6–12)
POTASSIUM SERPL-SCNC: 4 MMOL/L (ref 3.5–5.2)
QT INTERVAL: 462 MS
QT INTERVAL: 497 MS
QTC INTERVAL: 436 MS
QTC INTERVAL: 502 MS
RBC # BLD AUTO: 3.76 10*6/MM3 (ref 3.77–5.28)
SODIUM SERPL-SCNC: 144 MMOL/L (ref 136–145)
WBC NRBC COR # BLD AUTO: 8.36 10*3/MM3 (ref 3.4–10.8)

## 2025-07-17 PROCEDURE — 80048 BASIC METABOLIC PNL TOTAL CA: CPT | Performed by: INTERNAL MEDICINE

## 2025-07-17 PROCEDURE — 94799 UNLISTED PULMONARY SVC/PX: CPT

## 2025-07-17 PROCEDURE — 25010000002 ENOXAPARIN PER 10 MG: Performed by: INTERNAL MEDICINE

## 2025-07-17 PROCEDURE — 85025 COMPLETE CBC W/AUTO DIFF WBC: CPT | Performed by: INTERNAL MEDICINE

## 2025-07-17 PROCEDURE — 93005 ELECTROCARDIOGRAM TRACING: CPT | Performed by: INTERNAL MEDICINE

## 2025-07-17 PROCEDURE — 83735 ASSAY OF MAGNESIUM: CPT | Performed by: INTERNAL MEDICINE

## 2025-07-17 PROCEDURE — 93010 ELECTROCARDIOGRAM REPORT: CPT | Performed by: INTERNAL MEDICINE

## 2025-07-17 RX ORDER — ENOXAPARIN SODIUM 100 MG/ML
40 INJECTION SUBCUTANEOUS 2 TIMES DAILY
Status: DISCONTINUED | OUTPATIENT
Start: 2025-07-17 | End: 2025-07-18 | Stop reason: HOSPADM

## 2025-07-17 RX ADMIN — MUPIROCIN 1 APPLICATION: 20 OINTMENT TOPICAL at 21:32

## 2025-07-17 RX ADMIN — LISINOPRIL 20 MG: 20 TABLET ORAL at 08:59

## 2025-07-17 RX ADMIN — HYDROCHLOROTHIAZIDE 25 MG: 25 TABLET ORAL at 08:59

## 2025-07-17 RX ADMIN — PANTOPRAZOLE SODIUM 40 MG: 40 TABLET, DELAYED RELEASE ORAL at 05:34

## 2025-07-17 RX ADMIN — ASPIRIN 81 MG CHEWABLE TABLET 81 MG: 81 TABLET CHEWABLE at 08:59

## 2025-07-17 RX ADMIN — LEVOTHYROXINE SODIUM 112 MCG: 112 TABLET ORAL at 07:36

## 2025-07-17 RX ADMIN — MUPIROCIN 1 APPLICATION: 20 OINTMENT TOPICAL at 08:59

## 2025-07-17 RX ADMIN — TRAZODONE HYDROCHLORIDE 200 MG: 100 TABLET ORAL at 21:31

## 2025-07-17 RX ADMIN — ATORVASTATIN CALCIUM 20 MG: 20 TABLET, FILM COATED ORAL at 08:59

## 2025-07-17 RX ADMIN — ALLOPURINOL 300 MG: 300 TABLET ORAL at 08:59

## 2025-07-17 RX ADMIN — Medication 1000 MCG: at 08:59

## 2025-07-17 RX ADMIN — HYDROCODONE BITARTRATE AND ACETAMINOPHEN 1 TABLET: 10; 325 TABLET ORAL at 14:54

## 2025-07-17 RX ADMIN — METOPROLOL SUCCINATE 25 MG: 25 TABLET, EXTENDED RELEASE ORAL at 08:59

## 2025-07-17 RX ADMIN — GABAPENTIN 300 MG: 300 CAPSULE ORAL at 21:31

## 2025-07-17 RX ADMIN — ENOXAPARIN SODIUM 40 MG: 100 INJECTION SUBCUTANEOUS at 21:31

## 2025-07-17 RX ADMIN — HYDROCODONE BITARTRATE AND ACETAMINOPHEN 1 TABLET: 10; 325 TABLET ORAL at 21:31
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
## 2025-07-17 NOTE — PROGRESS NOTES
Hospitalist Service   Daily Progress Note      Patient Name: Tammie Whitehead  : 1960  MRN: [id number]  Primary Care Physician:  Radha Stanford APRN  Date of admission: 2025      Subjective        Patient seen and examined this morning.  Doing well overall this morning.  No further chest pain or shortness of breath.  Awaiting cardiology evaluation today for medication changes.  Catheter results discussed with her.      ROS: Pertinent positives as noted in HPI/subjective.  All other systems were reviewed and are negative.      Objective      Vitals:   Temp:  [98 °F (36.7 °C)-98.4 °F (36.9 °C)] 98.4 °F (36.9 °C)  Heart Rate:  [49-84] 62  Resp:  [10-21] 15  BP: ()/(39-97) 98/42    Physical Exam:    General: Awake, alert, NAD  Eyes: PERRL, EOMI, conjunctivae are clear  Cardiovascular: Regular rate and rhythm, no murmurs  Respiratory: Clear to auscultation bilaterally, no wheezing or rales, unlabored breathing  Abdomen: Soft, nontender, positive bowel sounds, no guarding  Neurologic: A&O, CN grossly intact, moves all extremities spontaneously  Musculoskeletal: Normal range of motion, no other gross deformities  Skin: Warm, dry         Result Review    Result Review:  I have personally reviewed the results from the time of this admission to 2025 11:28 EDT and agree with these findings:  [x]  Laboratory  []  Microbiology  [x]  Radiology  [x]  EKG/Telemetry   [x]  Cardiology/Vascular   []  Pathology  [x]  Old records  []  Other:          Assessment & Plan      Brief Patient Summary:  Tammie Whitehead is a 64 y.o. female with a CMH of CAD, HTN, Aortic Aneurysm, COPD, RAFAEL with CPAP, DM type 2, HLD, GERD, chronic pain, and hypothyroidism who presented to [facility] on 2025 with chest pain.  She presented with complaints of intermittent mid-low sternal chest pain that started around 1200 on 7/15.  She describes the pain as stabbing with radiation to jaw, left arm, and back.  Noted  to have NSTEMI, cardiology consulted.  Underwent cardiac cath on 7/16 with normal coronaries but stress-induced cardiomyopathy was noted.      allopurinol, 300 mg, Oral, Daily  aspirin, 324 mg, Oral, Once  aspirin, 81 mg, Oral, Daily  atorvastatin, 20 mg, Oral, Daily  enoxaparin sodium, 40 mg, Subcutaneous, Nightly  lisinopril, 20 mg, Oral, Q24H   And  hydroCHLOROthiazide, 25 mg, Oral, Q24H  levothyroxine, 112 mcg, Oral, QAM  metoprolol succinate XL, 25 mg, Oral, Daily  mupirocin, 1 Application, Each Nare, BID  pantoprazole, 40 mg, Oral, Q AM  traZODone, 200 mg, Oral, Nightly  vitamin B-12, 1,000 mcg, Oral, Daily             I have utilized all available, immediate resources to obtain, update, or review the patient's current medications including all prescriptions, over-the-counter products, herbals, cannabis/cannabidiol products, and vitamin.mineral/dietary (nutritional) supplements.    Active Hospital Problems:  Active Hospital Problems    Diagnosis     **NSTEMI (non-ST elevated myocardial infarction)        Assessment/Plan:     Chest pain, improved  NSTEMI  Stress-induced cardiomyopathy  -s/p LHC on 7/16 with no significant blockage, stress-induced cardiomyopathy noted  -Echo shows preserved EF during this admission  -GDMT being adjusted per cardiology  -Continue to monitor per cardiology    Hypertension  -Meds to be adjusted per cardiology, continue to monitor    Hyperlipidemia  -GERD  -Continue home meds as tolerated, monitor    Thoracic aortic aneurysm  -Followed by Dr Duong and more recently by Dr Mcmahon   8/2024 note reported review of CT showed no evidence of any significant aortic aneurysm   -Repeat CTA pending per cardiology    VTE Prophylaxis:  Pharmacologic VTE prophylaxis orders are present.        CODE STATUS:    Code Status (Patient has no pulse and is not breathing): CPR (Attempt to Resuscitate)  Medical Interventions (Patient has pulse or is breathing): Full Support      Disposition Planning:      Barriers to Discharge: Medication assessment, cardiology for  Anticipated Date of Discharge: 7/18  Place of Discharge: Home     Electronically signed by Jammie Ny DO, 07/17/25, 11:28 EDT.  [facility] Hospitalist Team      Part of this note may be an electronic transcription/translation of spoken language to printed text using the Dragon Dictation System.

## 2025-07-17 NOTE — CASE MANAGEMENT/SOCIAL WORK
Continued Stay Note  RITCHIE Dacosta     Patient Name: Tammie Whitehead  MRN: [id number]  Today's Date: 7/17/2025    Admit Date: 7/16/2025    Plan: DC Plan: Home with ex-spouse. Has CPAP through [facility]   Discharge Plan       Row Name 07/17/25 1504       Plan    Plan DC Plan: Home with ex-spouse. Has CPAP through [facility]    Provided Post Acute Provider List? N/A    Provided Post Acute Provider Quality & Resource List? N/A    Plan Comments CM spoke with Hospitalist and nursing for morning rounds and reviewed chart for clinical updates. Patient S/P Heart Cath on 7/16/2025. Dr. Ny reports Dr. Lopez would is adjusting meds today and would like to keep patient one more night for observation.CM will continue to follow for any additional needs. DC Barriers: Cardiac monitoring, and med changes.                 Expected Discharge Date and Time       Expected Discharge Date Expected Discharge Time    Jul 17, 2025               Maggi Marcus RN     Office Phone: [phone]  Office Cell:     [phone]

## 2025-07-18 ENCOUNTER — READMISSION MANAGEMENT (OUTPATIENT)
Dept: CALL CENTER | Facility: HOSPITAL | Age: 65
End: 2025-07-18
Payer: OTHER GOVERNMENT

## 2025-07-18 ENCOUNTER — TELEPHONE (OUTPATIENT)
Dept: CARDIOLOGY | Facility: CLINIC | Age: 65
End: 2025-07-18

## 2025-07-18 VITALS
SYSTOLIC BLOOD PRESSURE: 134 MMHG | WEIGHT: 287.7 LBS | RESPIRATION RATE: 16 BRPM | HEIGHT: 69 IN | HEART RATE: 71 BPM | DIASTOLIC BLOOD PRESSURE: 74 MMHG | OXYGEN SATURATION: 98 % | TEMPERATURE: 97.8 F | BODY MASS INDEX: 42.61 KG/M2

## 2025-07-18 LAB
ANION GAP SERPL CALCULATED.3IONS-SCNC: 11.8 MMOL/L (ref 5–15)
BASOPHILS # BLD AUTO: 0.03 10*3/MM3 (ref 0–0.2)
BASOPHILS NFR BLD AUTO: 0.3 % (ref 0–1.5)
BUN SERPL-MCNC: 15.8 MG/DL (ref 8–23)
BUN/CREAT SERPL: 17 (ref 7–25)
CALCIUM SPEC-SCNC: 8.6 MG/DL (ref 8.6–10.5)
CHLORIDE SERPL-SCNC: 104 MMOL/L (ref 98–107)
CO2 SERPL-SCNC: 25.2 MMOL/L (ref 22–29)
CREAT SERPL-MCNC: 0.93 MG/DL (ref 0.57–1)
DEPRECATED RDW RBC AUTO: 48.7 FL (ref 37–54)
EGFRCR SERPLBLD CKD-EPI 2021: 68.8 ML/MIN/1.73
EOSINOPHIL # BLD AUTO: 0.46 10*3/MM3 (ref 0–0.4)
EOSINOPHIL NFR BLD AUTO: 5.1 % (ref 0.3–6.2)
ERYTHROCYTE [DISTWIDTH] IN BLOOD BY AUTOMATED COUNT: 13.6 % (ref 12.3–15.4)
GLUCOSE SERPL-MCNC: 104 MG/DL (ref 65–99)
HCT VFR BLD AUTO: 36 % (ref 34–46.6)
HGB BLD-MCNC: 11.6 G/DL (ref 12–15.9)
IMM GRANULOCYTES # BLD AUTO: 0.02 10*3/MM3 (ref 0–0.05)
IMM GRANULOCYTES NFR BLD AUTO: 0.2 % (ref 0–0.5)
LYMPHOCYTES # BLD AUTO: 4.19 10*3/MM3 (ref 0.7–3.1)
LYMPHOCYTES NFR BLD AUTO: 46.8 % (ref 19.6–45.3)
MAGNESIUM SERPL-MCNC: 2 MG/DL (ref 1.6–2.4)
MCH RBC QN AUTO: 31.2 PG (ref 26.6–33)
MCHC RBC AUTO-ENTMCNC: 32.2 G/DL (ref 31.5–35.7)
MCV RBC AUTO: 96.8 FL (ref 79–97)
MONOCYTES # BLD AUTO: 0.62 10*3/MM3 (ref 0.1–0.9)
MONOCYTES NFR BLD AUTO: 6.9 % (ref 5–12)
NEUTROPHILS NFR BLD AUTO: 3.63 10*3/MM3 (ref 1.7–7)
NEUTROPHILS NFR BLD AUTO: 40.7 % (ref 42.7–76)
NRBC BLD AUTO-RTO: 0 /100 WBC (ref 0–0.2)
PLATELET # BLD AUTO: 230 10*3/MM3 (ref 140–450)
PMV BLD AUTO: 10.7 FL (ref 6–12)
POTASSIUM SERPL-SCNC: 3.7 MMOL/L (ref 3.5–5.2)
RBC # BLD AUTO: 3.72 10*6/MM3 (ref 3.77–5.28)
SODIUM SERPL-SCNC: 141 MMOL/L (ref 136–145)
WBC NRBC COR # BLD AUTO: 8.95 10*3/MM3 (ref 3.4–10.8)

## 2025-07-18 PROCEDURE — 93005 ELECTROCARDIOGRAM TRACING: CPT | Performed by: INTERNAL MEDICINE

## 2025-07-18 PROCEDURE — 25010000002 ENOXAPARIN PER 10 MG: Performed by: INTERNAL MEDICINE

## 2025-07-18 PROCEDURE — 85025 COMPLETE CBC W/AUTO DIFF WBC: CPT | Performed by: INTERNAL MEDICINE

## 2025-07-18 PROCEDURE — 80048 BASIC METABOLIC PNL TOTAL CA: CPT | Performed by: INTERNAL MEDICINE

## 2025-07-18 PROCEDURE — 99232 SBSQ HOSP IP/OBS MODERATE 35: CPT | Performed by: NURSE PRACTITIONER

## 2025-07-18 PROCEDURE — 83735 ASSAY OF MAGNESIUM: CPT | Performed by: INTERNAL MEDICINE

## 2025-07-18 RX ORDER — POTASSIUM CHLORIDE 1500 MG/1
20 TABLET, EXTENDED RELEASE ORAL ONCE
Status: COMPLETED | OUTPATIENT
Start: 2025-07-18 | End: 2025-07-18

## 2025-07-18 RX ADMIN — ENOXAPARIN SODIUM 40 MG: 100 INJECTION SUBCUTANEOUS at 09:03

## 2025-07-18 RX ADMIN — GABAPENTIN 300 MG: 300 CAPSULE ORAL at 09:30

## 2025-07-18 RX ADMIN — ASPIRIN 81 MG CHEWABLE TABLET 81 MG: 81 TABLET CHEWABLE at 09:03

## 2025-07-18 RX ADMIN — MUPIROCIN 1 APPLICATION: 20 OINTMENT TOPICAL at 09:03

## 2025-07-18 RX ADMIN — LEVOTHYROXINE SODIUM 112 MCG: 112 TABLET ORAL at 06:33

## 2025-07-18 RX ADMIN — HYDROCHLOROTHIAZIDE 25 MG: 25 TABLET ORAL at 09:04

## 2025-07-18 RX ADMIN — LISINOPRIL 20 MG: 20 TABLET ORAL at 09:04

## 2025-07-18 RX ADMIN — ATORVASTATIN CALCIUM 20 MG: 20 TABLET, FILM COATED ORAL at 09:03

## 2025-07-18 RX ADMIN — Medication 1000 MCG: at 09:03

## 2025-07-18 RX ADMIN — ALLOPURINOL 300 MG: 300 TABLET ORAL at 09:03

## 2025-07-18 RX ADMIN — PANTOPRAZOLE SODIUM 40 MG: 40 TABLET, DELAYED RELEASE ORAL at 06:33

## 2025-07-18 RX ADMIN — METOPROLOL SUCCINATE 25 MG: 25 TABLET, EXTENDED RELEASE ORAL at 09:04

## 2025-07-18 RX ADMIN — HYDROCODONE BITARTRATE AND ACETAMINOPHEN 1 TABLET: 10; 325 TABLET ORAL at 09:30

## 2025-07-18 RX ADMIN — POTASSIUM CHLORIDE 20 MEQ: 1500 TABLET, EXTENDED RELEASE ORAL at 09:03

## 2025-07-18 NOTE — PROGRESS NOTES
CARDIOLOGY FOLLOW-UP PROGRESS NOTE      Reason for follow-up:    NSTEMI, Takotsubo cardiomyopathy     Attending: Jammie Ny DO Subjective .     Patient lying in bed comfortably today.  She again explained her life stressors and how they are making her feel.  She expressed that she will be talking to her psychiatric care provider today about potential mental health medications including antidepressants.  She denies chest pain palpitations or shortness of breath.  No acute cardiovascular events overnight     Review of Systems   Psychiatric/Behavioral:  Positive for depression. The patient is nervous/anxious.      Pertinent items are noted in HPI, all other systems reviewed and negative  Allergies: Morphine and Valium [diazepam]    Scheduled Meds:allopurinol, 300 mg, Oral, Daily  aspirin, 324 mg, Oral, Once  aspirin, 81 mg, Oral, Daily  atorvastatin, 20 mg, Oral, Daily  enoxaparin sodium, 40 mg, Subcutaneous, BID  lisinopril, 20 mg, Oral, Q24H   And  hydroCHLOROthiazide, 25 mg, Oral, Q24H  levothyroxine, 112 mcg, Oral, QAM  metoprolol succinate XL, 25 mg, Oral, Daily  mupirocin, 1 Application, Each Nare, BID  pantoprazole, 40 mg, Oral, Q AM  traZODone, 200 mg, Oral, Nightly  vitamin B-12, 1,000 mcg, Oral, Daily        Continuous Infusions:     PRN Meds:.  acetaminophen **OR** acetaminophen **OR** acetaminophen    senna-docusate sodium **AND** polyethylene glycol **AND** bisacodyl **AND** bisacodyl    Calcium Replacement - Follow Nurse / BPA Driven Protocol    gabapentin    HYDROcodone-acetaminophen    ipratropium-albuterol    Magnesium Standard Dose Replacement - Follow Nurse / BPA Driven Protocol    melatonin    [Held by provider] metoprolol succinate XL    nitroglycerin    ondansetron ODT **OR** ondansetron    Phosphorus Replacement - Follow Nurse / BPA Driven Protocol    Potassium Replacement - Follow Nurse / BPA Driven Protocol    [COMPLETED] Insert Peripheral IV **AND** sodium chloride    Objective  "    VITAL SIGNS  Patient Vitals for the past 24 hrs:   BP Temp Temp src Pulse Resp SpO2   07/18/25 0900 118/49 -- -- -- -- 98 %   07/18/25 0746 131/54 97.8 °F (36.6 °C) Oral 65 16 95 %   07/18/25 0600 124/61 -- -- 53 -- 97 %   07/18/25 0500 120/57 -- -- 51 -- 93 %   07/18/25 0407 109/57 97.4 °F (36.3 °C) Axillary 76 16 96 %   07/18/25 0400 109/57 -- -- (!) 48 -- 93 %   07/18/25 0300 92/49 -- -- (!) 48 -- 91 %   07/18/25 0200 102/49 -- -- 58 -- 93 %   07/18/25 0100 115/52 -- -- 51 -- 91 %   07/18/25 0039 104/50 98.4 °F (36.9 °C) Axillary (!) 46 15 96 %   07/18/25 0000 104/50 -- -- 50 -- 92 %   07/17/25 2300 127/51 -- -- 59 -- 94 %   07/17/25 2252 -- -- -- 61 16 95 %   07/17/25 2200 92/40 -- -- 53 -- 91 %   07/17/25 2100 106/44 -- -- 51 -- 91 %   07/17/25 2025 110/43 98.3 °F (36.8 °C) Oral 56 17 93 %   07/17/25 2000 110/43 -- -- 53 -- 93 %   07/17/25 1900 -- -- -- 50 -- 95 %   07/17/25 1700 118/61 -- -- 53 -- 95 %   07/17/25 1610 121/65 98.1 °F (36.7 °C) Oral 58 12 97 %   07/17/25 1400 107/48 -- -- 56 -- --   07/17/25 1144 118/51 98 °F (36.7 °C) Oral 74 15 94 %   07/17/25 1100 98/42 -- -- 62 -- 95 %        Flowsheet Rows      Flowsheet Row First Filed Value   Admission Height 175.3 cm (69\") Documented at 07/16/2025 0736   Admission Weight 130 kg (287 lb) Documented at 07/16/2025 0736            Body mass index is 42.49 kg/m².      Intake/Output Summary (Last 24 hours) at 7/18/2025 0942  Last data filed at 7/18/2025 0931  Gross per 24 hour   Intake 1370 ml   Output 2400 ml   Net -1030 ml        TELEMETRY:     Sinus rhythm 60s    Physical Exam:  Physical Exam     Constitutional:       General: she is not in acute distress.     Appearance: Normal appearance. .   HENT:      Head: Normocephalic and atraumatic.   Eyes:      Extraocular Movements: Extraocular movements intact.      Pupils: Pupils are equal, round, and reactive to light.   Cardiovascular:      Rate and Rhythm: Normal rate and regular rhythm.   Pulmonary:      " "Effort: Pulmonary effort is normal.      Breath sounds: Normal breath sounds.   Abdominal:      General: Abdomen slightly distended. Bowel sounds are normal.      Palpations: Abdomen is soft.   Musculoskeletal:      Cervical back: Normal range of motion.   Skin:     General: Skin is warm and dry.      Right groin sight soft, mild bruising, no hematoma  Neurological:      General: No focal deficit present.      Mental Status: she is alert and oriented to person, place, and time. Mental status is at baseline.   Psychiatric:         Mood and Affect: depression, anxiety         Behavior: Behavior normal.         Thought Content: Thought content normal.       Results Review:   I reviewed the patient's new clinical results.    CBC    Results from last 7 days   Lab Units 07/18/25 0417 07/17/25  0541 07/16/25  0759   WBC 10*3/mm3 8.95 8.36 7.98   HEMOGLOBIN g/dL 11.6* 11.8* 11.9*   PLATELETS 10*3/mm3 230 242 256     BMP   Results from last 7 days   Lab Units 07/18/25 0417 07/17/25  0541 07/16/25  0759   SODIUM mmol/L 141 144 142   POTASSIUM mmol/L 3.7 4.0 3.9   CHLORIDE mmol/L 104 109* 106   CO2 mmol/L 25.2 24.1 24.7   BUN mg/dL 15.8 14.3 15.7   CREATININE mg/dL 0.93 0.88 0.92   GLUCOSE mg/dL 104* 109* 134*   MAGNESIUM mg/dL 2.0 2.1 2.0     Cr Clearance Estimated Creatinine Clearance: 88.9 mL/min (by C-G formula based on SCr of 0.93 mg/dL).  Coag   Results from last 7 days   Lab Units 07/16/25  0759   INR  0.98   APTT seconds 26.1     HbA1C   Lab Results   Component Value Date    HGBA1C 5.92 (H) 07/16/2025     Blood Glucose No results found for: \"POCGLU\"  Infection     CMP   Results from last 7 days   Lab Units 07/18/25 0417 07/17/25  0541 07/16/25  0759   SODIUM mmol/L 141 144 142   POTASSIUM mmol/L 3.7 4.0 3.9   CHLORIDE mmol/L 104 109* 106   CO2 mmol/L 25.2 24.1 24.7   BUN mg/dL 15.8 14.3 15.7   CREATININE mg/dL 0.93 0.88 0.92   GLUCOSE mg/dL 104* 109* 134*   ALBUMIN g/dL  --   --  4.3   BILIRUBIN mg/dL  --   --  0.5 " "  ALK PHOS U/L  --   --  87   AST (SGOT) U/L  --   --  41*   ALT (SGPT) U/L  --   --  51*   LIPASE U/L  --   --  9*     ABG      UA    Results from last 7 days   Lab Units 07/16/25  0849   NITRITE UA  Negative   WBC UA /HPF 6-10*   BACTERIA UA /HPF 1+*   SQUAM EPITHEL UA /HPF 3-6*     LEROY  No results found for: \"POCMETH\", \"POCAMPHET\", \"POCBARBITUR\", \"POCBENZO\", \"POCCOCAINE\", \"POCOPIATES\", \"POCOXYCODO\", \"POCPHENCYC\", \"POCPROPOXY\", \"POCTHC\", \"POCTRICYC\"  Lysis Labs   Results from last 7 days   Lab Units 07/18/25  0417 07/17/25  0541 07/16/25  0759   INR   --   --  0.98   APTT seconds  --   --  26.1   HEMOGLOBIN g/dL 11.6* 11.8* 11.9*   PLATELETS 10*3/mm3 230 242 256   CREATININE mg/dL 0.93 0.88 0.92     Radiology(recent) No radiology results for the last day    Imaging Results (Last 24 Hours)       ** No results found for the last 24 hours. **            Results from last 7 days   Lab Units 07/16/25  0904   HSTROP T ng/L 539*       EKG       I personally viewed and interpreted the patient's EKG/Telemetry data:        ECHOCARDIOGRAM:     Results for orders placed during the hospital encounter of 07/16/25    Adult Transthoracic Echo Complete W/ Cont if Necessary Per Protocol 07/16/2025 5683    Interpretation Summary    Left ventricular systolic function is normal. Calculated left ventricular 3D EF = 64% Left ventricular ejection fraction appears to be 61 - 65%.    Left ventricular diastolic function was normal.    The left atrial cavity is moderately dilated.    Moderate to severe mitral valve regurgitation is present.    Estimated right ventricular systolic pressure from tricuspid regurgitation is mildly elevated (35-45 mmHg).    Mild pulmonary hypertension is present.    There is a trivial pericardial effusion. There is no evidence of cardiac tamponade.    Would recommend transesophageal echo to better evaluate mitral regurgitation, if clinically indicated.            Assessment & Plan            NSTEMI (non-ST " elevated myocardial infarction)        ASSESSMENT:    Chest pain/NSTEMI  Hypertension  Hyperlipidemia  Obstructive sleep apnea  GERD  Thoracic aortic aneurysm      PLAN:    Continue beta-blocker and ACE inhibitor  Titrate medications for heart rate less than 80  Repeat echo in 90 days  Escalate to Entresto from lisinopril if she does not have significant recovery as outpatient  Hemodynamics are stable  Expect recovery  Patient wishes to now follow-up with Dr. Lopez as an outpatient              Electronically signed by EDWIN Navarro, 07/18/25, 9:42 AM EDT.          EDWIN Navarro  07/18/25  09:42 EDT

## 2025-07-18 NOTE — PAYOR COMM NOTE
"Tammie Whitehead (64 y.o. Female)  1960  Ref #[id number]   DC notice - Pt dc'd 25 Routine to home    JERE CONTRERAS LPN UR  Utilization Review Nurse  [facility]  Direct & confidential phone # [phone]  Fax # [phone]        Date of Birth   1960    Social Security Number       Address   [id number] [place] [address] [place] [address]    Home Phone   [phone]    MRN   [id number]       [facility]    Marital Status   Legally                             Admission Date   2025    Admission Type   Emergency    Admitting Provider   Jerry Jolly MD    Attending Provider       Department, Room/Bed   [facility] CARDIOVASCULAR CARE UNIT, 3107/1       Discharge Date   2025    Discharge Disposition   Home or Self Care    Discharge Destination                                 Attending Provider: (none)   Allergies: Morphine, Valium [Diazepam]    Isolation: None   Infection: None   Code Status: Prior    Ht: 175.3 cm (69\")   Wt: 130 kg (287 lb 11.2 oz)    Admission Cmt: None   Principal Problem: NSTEMI (non-ST elevated myocardial infarction) [I21.4]                   Active Insurance as of 2025       Primary Coverage       Payor Plan Insurance Group Employer/Plan Group    [facility] [place] [id number]       Payor Plan Address Payor Plan Phone Number Payor Plan Fax Number Effective Dates    MAIL STOP:    2024 - None Entered    [address] [address]       [facility] [id number]         Subscriber Name Subscriber Birth Date Member ID       TAMMIE WHITEHEAD 1960 [id number]                     Emergency Contacts        (Rel.) Home Phone Work Phone Mobile Phone    MAURILIOTAHIR (Spouse) [phone] -- [phone]                 Discharge Summary        Jammie Ny DO at 25 1039                Hospitalist Service   DISCHARGE SUMMARY    Patient Name: Tammie Whitehead  : " 1960  MRN: [id number]    Date of Admission: 7/16/2025  Date of Discharge:  07/18/25    Primary Care Physician: Radha Stanford APRN      Presenting Problem:   NSTEMI (non-ST elevated myocardial infarction) [I21.4]    Active and Resolved Hospital Problems:  Active Hospital Problems    Diagnosis POA    **NSTEMI (non-ST elevated myocardial infarction) [I21.4] Yes      Resolved Hospital Problems   No resolved problems to display.         Hospital Course     Hospital Course:  Tammie Whitehead is a 64 y.o. female with a CMH of CAD, HTN, Aortic Aneurysm, COPD, RAFAEL with CPAP, DM type 2, HLD, GERD, chronic pain, and hypothyroidism who presented to [facility] on 7/16/2025 with chest pain.  She presented with complaints of intermittent mid-low sternal chest pain that started around 1200 on 7/15.  She describes the pain as stabbing with radiation to jaw, left arm, and back.  Noted to have NSTEMI, cardiology consulted.  Underwent cardiac cath on 7/16 with normal coronaries but stress-induced cardiomyopathy was noted.  Further details as noted below.  Patient is medically stable to discharge home today.    Assessment/Plan:      Chest pain, improved  NSTEMI  Stress-induced cardiomyopathy  -s/p LHC on 7/16 with no significant blockage, stress-induced cardiomyopathy noted  -Echo shows preserved EF during this admission  -GDMT per cardiology  -Okay to discharge per cardiology     Hypertension  -Continue current meds for now per cardiology, further adjustment as outpatient if needed     Hyperlipidemia  -GERD  -Continue home meds as tolerated, monitor     Thoracic aortic aneurysm  -Followed by Dr Duong and more recently by Dr Mcmahon   8/2024 note reported review of CT showed no evidence of any significant aortic aneurysm   -Further follow-up with cardiology as outpatient    DISCHARGE Follow Up Recommendations for labs and diagnostics:   Follow-up with PCP and cardiology    Day of Discharge     Vital  Signs:  Temp:  [97.4 °F (36.3 °C)-98.4 °F (36.9 °C)] 97.8 °F (36.6 °C)  Heart Rate:  [] 71  Resp:  [12-17] 16  BP: ()/(40-74) 134/74    Physical Exam:  General: Awake, alert, NAD  Eyes: PERRL, EOMI, conjunctivae are clear  Cardiovascular: Regular rate and rhythm, no murmurs  Respiratory: Clear to auscultation bilaterally, no wheezing or rales, unlabored breathing  Abdomen: Soft, nontender, positive bowel sounds, no guarding  Neurologic: A&O, CN grossly intact, moves all extremities spontaneously  Musculoskeletal: Normal range of motion, no other gross deformities  Skin: Warm, dry        Pertinent  and/or Most Recent Results     LAB RESULTS:      Lab 07/18/25 0417 07/17/25  0541 07/16/25  0759   WBC 8.95 8.36 7.98   HEMOGLOBIN 11.6* 11.8* 11.9*   HEMATOCRIT 36.0 36.5 37.4   PLATELETS 230 242 256   NEUTROS ABS 3.63 4.48 4.08   IMMATURE GRANS (ABS) 0.02 0.03 0.01   LYMPHS ABS 4.19* 2.95 2.97   MONOS ABS 0.62 0.56 0.44   EOS ABS 0.46* 0.30 0.43*   MCV 96.8 97.1* 95.9   PROTIME  --   --  12.9   APTT  --   --  26.1   D DIMER QUANT  --   --  0.63         Lab 07/18/25 0417 07/17/25  0541 07/16/25  0904 07/16/25  0759   SODIUM 141 144  --  142   POTASSIUM 3.7 4.0  --  3.9   CHLORIDE 104 109*  --  106   CO2 25.2 24.1  --  24.7   ANION GAP 11.8 10.9  --  11.3   BUN 15.8 14.3  --  15.7   CREATININE 0.93 0.88  --  0.92   EGFR 68.8 73.5  --  69.7   GLUCOSE 104* 109*  --  134*   CALCIUM 8.6 8.8  --  9.7   MAGNESIUM 2.0 2.1  --  2.0   HEMOGLOBIN A1C  --   --   --  5.92*   TSH  --   --  0.929  --          Lab 07/16/25  0759   TOTAL PROTEIN 6.7   ALBUMIN 4.3   GLOBULIN 2.4   ALT (SGPT) 51*   AST (SGOT) 41*   BILIRUBIN 0.5   ALK PHOS 87   LIPASE 9*         Lab 07/16/25  0904 07/16/25  0759   HSTROP T 539* 425*   PROTIME  --  12.9   INR  --  0.98         Lab 07/16/25  1300   CHOLESTEROL 130   LDL CHOL 71   HDL CHOL 40   TRIGLYCERIDES 102             Brief Urine Lab Results  (Last result in the past 365 days)        Color    Clarity   Blood   Leuk Est   Nitrite   Protein   CREAT   Urine HCG        07/16/25 0849 Yellow   Clear   Negative   Moderate (2+)   Negative   Negative                 Microbiology Results (last 10 days)       ** No results found for the last 240 hours. **            XR Chest 1 View  Result Date: 7/16/2025  Impression: Impression: 1. Cardiomegaly. 2. Mild right basilar airspace disease favor atelectasis/scarring. Electronically Signed: Mendez Chang MD  7/16/2025 7:58 AM EDT  Workstation ID: [id number]      Results for orders placed during the hospital encounter of 04/07/21    Duplex Venous Lower Extremity - Left 04/07/2021  7:14 PM    Interpretation Summary  · Normal left lower extremity venous duplex scan.      Results for orders placed during the hospital encounter of 04/07/21    Duplex Venous Lower Extremity - Left 04/07/2021  7:14 PM    Interpretation Summary  · Normal left lower extremity venous duplex scan.      Results for orders placed during the hospital encounter of 07/16/25    Adult Transthoracic Echo Complete W/ Cont if Necessary Per Protocol 07/16/2025  5:18 PM    Interpretation Summary    Left ventricular systolic function is normal. Calculated left ventricular 3D EF = 64% Left ventricular ejection fraction appears to be 61 - 65%.    Left ventricular diastolic function was normal.    The left atrial cavity is moderately dilated.    Moderate to severe mitral valve regurgitation is present.    Estimated right ventricular systolic pressure from tricuspid regurgitation is mildly elevated (35-45 mmHg).    Mild pulmonary hypertension is present.    There is a trivial pericardial effusion. There is no evidence of cardiac tamponade.    Would recommend transesophageal echo to better evaluate mitral regurgitation, if clinically indicated.      Labs Pending at Discharge:      Procedures Performed  Procedure(s):  Left Heart Cath         Consults:   Consults       Date and Time Order Name Status Description     7/16/2025  9:01 AM Hospitalist (on-call MD unless specified)      7/16/2025  8:39 AM Cardiology (on-call MD unless specified) Completed               Discharge Details        Discharge Medications        Changes to Medications        Instructions Start Date   metoprolol succinate XL 25 MG 24 hr tablet  Commonly known as: TOPROL-XL  What changed: Another medication with the same name was removed. Continue taking this medication, and follow the directions you see here.   25 mg, Daily             Continue These Medications        Instructions Start Date   albuterol sulfate  (90 Base) MCG/ACT inhaler  Commonly known as: PROVENTIL HFA;VENTOLIN HFA;PROAIR HFA   2 puffs, Inhalation, Every 4 to 6 Hours PRN      allopurinol 300 MG tablet  Commonly known as: ZYLOPRIM   300 mg, Daily      aspirin 81 MG tablet   81 mg, Daily      atorvastatin 20 MG tablet  Commonly known as: LIPITOR   20 mg, Daily      Biotin 10 MG capsule   10 mg, Daily      Calcium + D3 600-200 MG-UNIT tablet   Take 1 tablet by mouth Daily.      Claritin 10 MG tablet  Generic drug: loratadine   10 mg, Daily      diclofenac 75 MG EC tablet  Commonly known as: VOLTAREN   75 mg, Oral, 2 Times Daily PRN      famotidine 20 MG tablet  Commonly known as: PEPCID   20 mg, Oral, 2 Times Daily PRN      fluticasone 50 MCG/ACT nasal spray  Commonly known as: FLONASE   2 sprays, Nasal, Daily      furosemide 20 MG tablet  Commonly known as: LASIX   20 mg, Oral, As Needed      gabapentin 300 MG capsule  Commonly known as: NEURONTIN   300 mg, Oral, 4 Times Daily PRN      HYDROcodone-acetaminophen  MG per tablet  Commonly known as: NORCO   1 tablet, Oral, Every 6 Hours PRN      levothyroxine 112 MCG tablet  Commonly known as: SYNTHROID, LEVOTHROID   112 mcg, Every Morning      lisinopril-hydrochlorothiazide 20-25 MG per tablet  Commonly known as: PRINZIDE,ZESTORETIC   1 tablet, Oral, Daily, In addition to lisinopril 20 mg      meclizine 25 MG tablet  Commonly  known as: ANTIVERT   1 or 2 by mouth every 8 hours as needed for dizziness      metFORMIN 500 MG tablet  Commonly known as: GLUCOPHAGE   500 mg, 2 Times Daily With Meals      metoclopramide 5 MG tablet  Commonly known as: REGLAN   5 mg, Oral, 2 Times Daily PRN      polyethylene glycol 17 g packet  Commonly known as: MIRALAX   17 g, Oral, Daily PRN      traZODone 100 MG tablet  Commonly known as: DESYREL   200 mg, Nightly      vitamin B-12 1000 MCG tablet  Commonly known as: CYANOCOBALAMIN   1,000 mcg, Daily             Stop These Medications      lisinopril 20 MG tablet  Commonly known as: PRINIVIL,ZESTRIL              Allergies   Allergen Reactions    Morphine Shortness Of Breath    Valium [Diazepam] GI Intolerance         Discharge Disposition:  Home or Self Care    Diet:  Hospital:  Diet Order   Procedures    Diet: Cardiac; Healthy Heart (2-3 Na+); Fluid Consistency: Thin (IDDSI 0)         Discharge Activity:         CODE STATUS:  Code Status and Medical Interventions: CPR (Attempt to Resuscitate); Full Support   Ordered at: 07/16/25 1415     Code Status (Patient has no pulse and is not breathing):    CPR (Attempt to Resuscitate)     Medical Interventions (Patient has pulse or is breathing):    Full Support         Future Appointments   Date Time Provider Department Center   10/27/2025  2:20 PM Seipel, Joseph F, MD [facility]       Additional Instructions for the Follow-ups that You Need to Schedule       Ambulatory Referral to Cardiac Rehab   As directed              Time spent on Discharge including face to face service:  35 minutes    Signature:    Electronically signed by Jammie Ny DO, 07/18/25, 10:39 AM EDT.      Part of this note may be an electronic transcription/translation of spoken language to printed text using the Dragon Dictation System.      Electronically signed by Jammie Ny DO at 07/18/25 104

## 2025-07-18 NOTE — CASE MANAGEMENT/SOCIAL WORK
Case Management Discharge Note      Final Note: CM reviewed chart documentation for clinical updates.DC orders in place. No additional service needs identified upon chart review. No high cost meds. On room air. No barriers.      Transportation Services  Transportation: Private Transportation  Private: Car (with spouse)    Final Discharge Disposition Code: 01 - home or self-care    Maggi Marcus RN    Office Phone: [phone]  Office Cell:     [phone]

## 2025-07-18 NOTE — DISCHARGE SUMMARY
Hospitalist Service   DISCHARGE SUMMARY    Patient Name: Tammie Whitehead  : 1960  MRN: [id number]    Date of Admission: 2025  Date of Discharge:  25    Primary Care Physician: Radha Stanford APRN      Presenting Problem:   NSTEMI (non-ST elevated myocardial infarction) [I21.4]    Active and Resolved Hospital Problems:  Active Hospital Problems    Diagnosis POA    **NSTEMI (non-ST elevated myocardial infarction) [I21.4] Yes      Resolved Hospital Problems   No resolved problems to display.         Hospital Course     Hospital Course:  Tammie Whitehead is a 64 y.o. female with a CMH of CAD, HTN, Aortic Aneurysm, COPD, RAFAEL with CPAP, DM type 2, HLD, GERD, chronic pain, and hypothyroidism who presented to [facility] on 2025 with chest pain.  She presented with complaints of intermittent mid-low sternal chest pain that started around 1200 on 7/15.  She describes the pain as stabbing with radiation to jaw, left arm, and back.  Noted to have NSTEMI, cardiology consulted.  Underwent cardiac cath on  with normal coronaries but stress-induced cardiomyopathy was noted.  Further details as noted below.  Patient is medically stable to discharge home today.    Assessment/Plan:      Chest pain, improved  NSTEMI  Stress-induced cardiomyopathy  -s/p LHC on  with no significant blockage, stress-induced cardiomyopathy noted  -Echo shows preserved EF during this admission  -GDMT per cardiology  -Okay to discharge per cardiology     Hypertension  -Continue current meds for now per cardiology, further adjustment as outpatient if needed     Hyperlipidemia  -GERD  -Continue home meds as tolerated, monitor     Thoracic aortic aneurysm  -Followed by Dr Duong and more recently by Dr Mcmahon   2024 note reported review of CT showed no evidence of any significant aortic aneurysm   -Further follow-up with cardiology as outpatient    DISCHARGE Follow Up Recommendations for labs and  diagnostics:   Follow-up with PCP and cardiology    Day of Discharge     Vital Signs:  Temp:  [97.4 °F (36.3 °C)-98.4 °F (36.9 °C)] 97.8 °F (36.6 °C)  Heart Rate:  [] 71  Resp:  [12-17] 16  BP: ()/(40-74) 134/74    Physical Exam:  General: Awake, alert, NAD  Eyes: PERRL, EOMI, conjunctivae are clear  Cardiovascular: Regular rate and rhythm, no murmurs  Respiratory: Clear to auscultation bilaterally, no wheezing or rales, unlabored breathing  Abdomen: Soft, nontender, positive bowel sounds, no guarding  Neurologic: A&O, CN grossly intact, moves all extremities spontaneously  Musculoskeletal: Normal range of motion, no other gross deformities  Skin: Warm, dry        Pertinent  and/or Most Recent Results     LAB RESULTS:      Lab 07/18/25 0417 07/17/25  0541 07/16/25  0759   WBC 8.95 8.36 7.98   HEMOGLOBIN 11.6* 11.8* 11.9*   HEMATOCRIT 36.0 36.5 37.4   PLATELETS 230 242 256   NEUTROS ABS 3.63 4.48 4.08   IMMATURE GRANS (ABS) 0.02 0.03 0.01   LYMPHS ABS 4.19* 2.95 2.97   MONOS ABS 0.62 0.56 0.44   EOS ABS 0.46* 0.30 0.43*   MCV 96.8 97.1* 95.9   PROTIME  --   --  12.9   APTT  --   --  26.1   D DIMER QUANT  --   --  0.63         Lab 07/18/25 0417 07/17/25  0541 07/16/25  0904 07/16/25  0759   SODIUM 141 144  --  142   POTASSIUM 3.7 4.0  --  3.9   CHLORIDE 104 109*  --  106   CO2 25.2 24.1  --  24.7   ANION GAP 11.8 10.9  --  11.3   BUN 15.8 14.3  --  15.7   CREATININE 0.93 0.88  --  0.92   EGFR 68.8 73.5  --  69.7   GLUCOSE 104* 109*  --  134*   CALCIUM 8.6 8.8  --  9.7   MAGNESIUM 2.0 2.1  --  2.0   HEMOGLOBIN A1C  --   --   --  5.92*   TSH  --   --  0.929  --          Lab 07/16/25  0759   TOTAL PROTEIN 6.7   ALBUMIN 4.3   GLOBULIN 2.4   ALT (SGPT) 51*   AST (SGOT) 41*   BILIRUBIN 0.5   ALK PHOS 87   LIPASE 9*         Lab 07/16/25  0904 07/16/25  0759   HSTROP T 539* 425*   PROTIME  --  12.9   INR  --  0.98         Lab 07/16/25  1300   CHOLESTEROL 130   LDL CHOL 71   HDL CHOL 40   TRIGLYCERIDES 102              Brief Urine Lab Results  (Last result in the past 365 days)        Color   Clarity   Blood   Leuk Est   Nitrite   Protein   CREAT   Urine HCG        07/16/25 0849 Yellow   Clear   Negative   Moderate (2+)   Negative   Negative                 Microbiology Results (last 10 days)       ** No results found for the last 240 hours. **            XR Chest 1 View  Result Date: 7/16/2025  Impression: Impression: 1. Cardiomegaly. 2. Mild right basilar airspace disease favor atelectasis/scarring. Electronically Signed: Mendez Chang MD  7/16/2025 7:58 AM EDT  Workstation ID: [id number]      Results for orders placed during the hospital encounter of 04/07/21    Duplex Venous Lower Extremity - Left 04/07/2021  7:14 PM    Interpretation Summary  · Normal left lower extremity venous duplex scan.      Results for orders placed during the hospital encounter of 04/07/21    Duplex Venous Lower Extremity - Left 04/07/2021  7:14 PM    Interpretation Summary  · Normal left lower extremity venous duplex scan.      Results for orders placed during the hospital encounter of 07/16/25    Adult Transthoracic Echo Complete W/ Cont if Necessary Per Protocol 07/16/2025  5:18 PM    Interpretation Summary    Left ventricular systolic function is normal. Calculated left ventricular 3D EF = 64% Left ventricular ejection fraction appears to be 61 - 65%.    Left ventricular diastolic function was normal.    The left atrial cavity is moderately dilated.    Moderate to severe mitral valve regurgitation is present.    Estimated right ventricular systolic pressure from tricuspid regurgitation is mildly elevated (35-45 mmHg).    Mild pulmonary hypertension is present.    There is a trivial pericardial effusion. There is no evidence of cardiac tamponade.    Would recommend transesophageal echo to better evaluate mitral regurgitation, if clinically indicated.      Labs Pending at Discharge:      Procedures Performed  Procedure(s):  Left Heart Cath          Consults:   Consults       Date and Time Order Name Status Description    7/16/2025  9:01 AM Hospitalist (on-call MD unless specified)      7/16/2025  8:39 AM Cardiology (on-call MD unless specified) Completed               Discharge Details        Discharge Medications        Changes to Medications        Instructions Start Date   metoprolol succinate XL 25 MG 24 hr tablet  Commonly known as: TOPROL-XL  What changed: Another medication with the same name was removed. Continue taking this medication, and follow the directions you see here.   25 mg, Daily             Continue These Medications        Instructions Start Date   albuterol sulfate  (90 Base) MCG/ACT inhaler  Commonly known as: PROVENTIL HFA;VENTOLIN HFA;PROAIR HFA   2 puffs, Inhalation, Every 4 to 6 Hours PRN      allopurinol 300 MG tablet  Commonly known as: ZYLOPRIM   300 mg, Daily      aspirin 81 MG tablet   81 mg, Daily      atorvastatin 20 MG tablet  Commonly known as: LIPITOR   20 mg, Daily      Biotin 10 MG capsule   10 mg, Daily      Calcium + D3 600-200 MG-UNIT tablet   Take 1 tablet by mouth Daily.      Claritin 10 MG tablet  Generic drug: loratadine   10 mg, Daily      diclofenac 75 MG EC tablet  Commonly known as: VOLTAREN   75 mg, Oral, 2 Times Daily PRN      famotidine 20 MG tablet  Commonly known as: PEPCID   20 mg, Oral, 2 Times Daily PRN      fluticasone 50 MCG/ACT nasal spray  Commonly known as: FLONASE   2 sprays, Nasal, Daily      furosemide 20 MG tablet  Commonly known as: LASIX   20 mg, Oral, As Needed      gabapentin 300 MG capsule  Commonly known as: NEURONTIN   300 mg, Oral, 4 Times Daily PRN      HYDROcodone-acetaminophen  MG per tablet  Commonly known as: NORCO   1 tablet, Oral, Every 6 Hours PRN      levothyroxine 112 MCG tablet  Commonly known as: SYNTHROID, LEVOTHROID   112 mcg, Every Morning      lisinopril-hydrochlorothiazide 20-25 MG per tablet  Commonly known as: PRINZIDE,ZESTORETIC   1 tablet, Oral,  Daily, In addition to lisinopril 20 mg      meclizine 25 MG tablet  Commonly known as: ANTIVERT   1 or 2 by mouth every 8 hours as needed for dizziness      metFORMIN 500 MG tablet  Commonly known as: GLUCOPHAGE   500 mg, 2 Times Daily With Meals      metoclopramide 5 MG tablet  Commonly known as: REGLAN   5 mg, Oral, 2 Times Daily PRN      polyethylene glycol 17 g packet  Commonly known as: MIRALAX   17 g, Oral, Daily PRN      traZODone 100 MG tablet  Commonly known as: DESYREL   200 mg, Nightly      vitamin B-12 1000 MCG tablet  Commonly known as: CYANOCOBALAMIN   1,000 mcg, Daily             Stop These Medications      lisinopril 20 MG tablet  Commonly known as: PRINIVIL,ZESTRIL              Allergies   Allergen Reactions    Morphine Shortness Of Breath    Valium [Diazepam] GI Intolerance         Discharge Disposition:  Home or Self Care    Diet:  Hospital:  Diet Order   Procedures    Diet: Cardiac; Healthy Heart (2-3 Na+); Fluid Consistency: Thin (IDDSI 0)         Discharge Activity:         CODE STATUS:  Code Status and Medical Interventions: CPR (Attempt to Resuscitate); Full Support   Ordered at: 07/16/25 1415     Code Status (Patient has no pulse and is not breathing):    CPR (Attempt to Resuscitate)     Medical Interventions (Patient has pulse or is breathing):    Full Support         Future Appointments   Date Time Provider Department Center   10/27/2025  2:20 PM Seipel, Joseph F, MD [facility]       Additional Instructions for the Follow-ups that You Need to Schedule       Ambulatory Referral to Cardiac Rehab   As directed              Time spent on Discharge including face to face service:  35 minutes    Signature:    Electronically signed by Jammie Ny DO, 07/18/25, 10:39 AM EDT.      Part of this note may be an electronic transcription/translation of spoken language to printed text using the Dragon Dictation System.

## 2025-07-19 LAB
QT INTERVAL: 582 MS
QT INTERVAL: 603 MS
QTC INTERVAL: 516 MS
QTC INTERVAL: 542 MS

## 2025-07-19 NOTE — OUTREACH NOTE
Prep Survey      Flowsheet Row Responses   [facility] facility patient discharged from? [facility]   Is LACE score < 7 ? No   Eligibility Readm Mgmt   Discharge diagnosis NSTEMI (non-ST elevated myocardial infarction)   Does the patient have one of the following disease processes/diagnoses(primary or secondary)? Acute MI (STEMI,NSTEMI)   Prep survey completed? Yes            Julissa CLAY - Registered Nurse

## 2025-07-21 NOTE — PROGRESS NOTES
Enter Query Response Below       cNSTEMI type II is integral to Takotsubo’s Cardiomyopathy          If applicable, please update the problem list.

## 2025-07-25 ENCOUNTER — READMISSION MANAGEMENT (OUTPATIENT)
Dept: CALL CENTER | Facility: HOSPITAL | Age: 65
End: 2025-07-25
Payer: OTHER GOVERNMENT

## 2025-07-25 ENCOUNTER — TELEPHONE (OUTPATIENT)
Dept: CARDIOLOGY | Facility: CLINIC | Age: 65
End: 2025-07-25
Payer: OTHER GOVERNMENT

## 2025-07-25 NOTE — OUTREACH NOTE
AMI Week 1 Survey      Flowsheet Row Responses   [facility], [place], operated by [facility] patient discharged from? [facility]   Does the patient have one of the following disease processes/diagnoses(primary or secondary)? Acute MI (STEMI,NSTEMI)   Week 1 attempt successful? Yes   Call start time 1636   Call end time 1641   Discharge diagnosis NSTEMI (non-ST elevated myocardial infarction)   Person spoke with today (if not patient) and relationship Patient   Meds reviewed with patient/caregiver? Yes   Is the patient having any side effects they believe may be caused by any medication additions or changes? No   Does the patient have all prescriptions related to this admission filled (includes statins,anticoagulants,HTN meds,anti-arrhythmia meds) Yes   Is the patient taking all medications as directed (includes completed medication regime)? Yes   Does the patient have a primary care provider?  Yes   Has the patient kept scheduled appointments due by today? Yes   Psychosocial issues? No   Did the patient receive a copy of their discharge instructions? Yes   Nursing interventions Reviewed instructions with patient   What is the patient's perception of their health status since discharge? Improving   Is the patient/caregiver able to teach back signs and symptoms of when to call for help immediately: Sudden chest discomfort, Sudden discomfort in arms, back, neck or jaw, Shortness of breath at any time, Sudden sweating or clammy skin, Nausea or vomiting, Dizziness or lightheadedness, Irregular or rapid heart rate   Nursing interventions Nurse provided patient education   Is the patient/caregiver able to teach back ways to prevent a second heart attack: Follow up with MD, Take medications   If the patient is a current smoker, are they able to teach back resources for cessation? Not a smoker   Is the patient/caregiver able to teach back the hierarchy of who to call/visit for symptoms/problems? PCP, Specialist, Home health nurse, Urgent Care, ED, 911 Yes    Week 1 call completed? Yes   Is the patient interested in additional calls from an ambulatory ? No   Would this patient benefit from a Referral to [facility]? No   Graduated/Revoked comments Doing well.   Call end time 1643            Cornell ALLEN - Registered Nurse

## 2025-07-25 NOTE — TELEPHONE ENCOUNTER
Caller: Tammie Whitehead    Relationship to patient: Self    Best call back number: [phone]    Chief complaint: N/A    Type of visit: F/U    Requested date: ASAP    If rescheduling, when is the original appointment: 9/2     Additional notes:PT HAD SEEN DR FISHER FOR HEART CATH AND HAD AN APPT [facility] FOR THEM. WERE SUPPOSED TO BE SEEN WITHIN A WEEK AND WERE SCHEDULED FOR 9/2. WOULD LIKE TO HAVE THAT APPT MOVED UP. PLEASE CALL PT TO ADVISE.

## 2025-07-30 PROBLEM — I42.8 NICM (NONISCHEMIC CARDIOMYOPATHY): Status: ACTIVE | Noted: 2025-07-30

## 2025-07-30 PROBLEM — E78.5 HYPERLIPIDEMIA LDL GOAL <70: Status: ACTIVE | Noted: 2025-07-30
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
## 2025-07-31 ENCOUNTER — OFFICE VISIT (OUTPATIENT)
Dept: CARDIOLOGY | Facility: CLINIC | Age: 65
End: 2025-07-31
Payer: OTHER GOVERNMENT

## 2025-07-31 ENCOUNTER — TELEPHONE (OUTPATIENT)
Dept: CARDIAC REHAB | Facility: HOSPITAL | Age: 65
End: 2025-07-31
Payer: OTHER GOVERNMENT

## 2025-07-31 VITALS
WEIGHT: 286 LBS | OXYGEN SATURATION: 96 % | DIASTOLIC BLOOD PRESSURE: 66 MMHG | BODY MASS INDEX: 42.36 KG/M2 | SYSTOLIC BLOOD PRESSURE: 120 MMHG | HEIGHT: 69 IN | HEART RATE: 50 BPM | RESPIRATION RATE: 18 BRPM

## 2025-07-31 DIAGNOSIS — G47.33 OBSTRUCTIVE SLEEP APNEA SYNDROME: ICD-10-CM

## 2025-07-31 DIAGNOSIS — E78.5 HYPERLIPIDEMIA LDL GOAL <70: ICD-10-CM

## 2025-07-31 DIAGNOSIS — I21.4 NSTEMI (NON-ST ELEVATED MYOCARDIAL INFARCTION): Primary | ICD-10-CM

## 2025-07-31 DIAGNOSIS — I10 PRIMARY HYPERTENSION: ICD-10-CM

## 2025-07-31 DIAGNOSIS — I42.8 NICM (NONISCHEMIC CARDIOMYOPATHY): ICD-10-CM

## 2025-07-31 RX ORDER — HYDROXYZINE HYDROCHLORIDE 10 MG/1
10 TABLET, FILM COATED ORAL AS NEEDED
COMMUNITY
Start: 2025-07-18

## 2025-07-31 NOTE — TELEPHONE ENCOUNTER
Discussed cardiac rehab. Does not have a car or transportation to attend at this time. If situation changes she has our contact # and will reach out.

## (undated) DEVICE — TR BAND RADIAL ARTERY COMPRESSION DEVICE: Brand: TR BAND

## (undated) DEVICE — ELECTRD DEFIB M/FUNC PROPADZ RADIOL 2PK

## (undated) DEVICE — DGW .035 FC J3MM 260CM TEF: Brand: EMERALD

## (undated) DEVICE — PK TRY HEART CATH 50

## (undated) DEVICE — DRAPE, RADIAL, STERILE: Brand: MEDLINE

## (undated) DEVICE — CATH DIAG IMPULSE FR4 5F 100CM

## (undated) DEVICE — GLIDESHEATH SLENDER ACCESS KIT: Brand: GLIDESHEATH SLENDER

## (undated) DEVICE — ST ACC MICROPUNCTURE STFF/CANN PLAT/TP 4F 21G 40CM

## (undated) DEVICE — CATH DIAG IMPULSE FL3.5 5F 100CM